# Patient Record
Sex: FEMALE | Race: WHITE | NOT HISPANIC OR LATINO | URBAN - METROPOLITAN AREA
[De-identification: names, ages, dates, MRNs, and addresses within clinical notes are randomized per-mention and may not be internally consistent; named-entity substitution may affect disease eponyms.]

---

## 2017-12-08 ENCOUNTER — OUTPATIENT (OUTPATIENT)
Dept: OUTPATIENT SERVICES | Facility: HOSPITAL | Age: 47
LOS: 1 days | Discharge: HOME | End: 2017-12-08
Payer: COMMERCIAL

## 2017-12-08 DIAGNOSIS — F33.9 MAJOR DEPRESSIVE DISORDER, RECURRENT, UNSPECIFIED: ICD-10-CM

## 2017-12-14 PROCEDURE — 99205 OFFICE O/P NEW HI 60 MIN: CPT

## 2017-12-27 LAB — HCG SERPL-ACNC: < 5 MIU/ML — SIGNIFICANT CHANGE UP

## 2017-12-27 PROCEDURE — 90870 ELECTROCONVULSIVE THERAPY: CPT

## 2017-12-29 PROCEDURE — 90870 ELECTROCONVULSIVE THERAPY: CPT

## 2018-01-02 ENCOUNTER — INPATIENT (INPATIENT)
Facility: HOSPITAL | Age: 48
LOS: 2 days | Discharge: HOME | End: 2018-01-05
Attending: PSYCHIATRY & NEUROLOGY | Admitting: PSYCHIATRY & NEUROLOGY
Payer: COMMERCIAL

## 2018-01-02 VITALS
DIASTOLIC BLOOD PRESSURE: 98 MMHG | HEART RATE: 100 BPM | RESPIRATION RATE: 18 BRPM | SYSTOLIC BLOOD PRESSURE: 125 MMHG | TEMPERATURE: 98 F | OXYGEN SATURATION: 97 %

## 2018-01-02 DIAGNOSIS — F33.2 MAJOR DEPRESSIVE DISORDER, RECURRENT SEVERE WITHOUT PSYCHOTIC FEATURES: ICD-10-CM

## 2018-01-02 DIAGNOSIS — F43.10 POST-TRAUMATIC STRESS DISORDER, UNSPECIFIED: ICD-10-CM

## 2018-01-02 DIAGNOSIS — F60.9 PERSONALITY DISORDER, UNSPECIFIED: ICD-10-CM

## 2018-01-02 LAB
ALBUMIN SERPL ELPH-MCNC: 4.4 G/DL — SIGNIFICANT CHANGE UP (ref 3.3–5)
ALP SERPL-CCNC: 78 U/L — SIGNIFICANT CHANGE UP (ref 40–120)
ALT FLD-CCNC: 18 U/L — SIGNIFICANT CHANGE UP (ref 4–33)
APAP SERPL-MCNC: < 15 UG/ML — LOW (ref 15–25)
AST SERPL-CCNC: 19 U/L — SIGNIFICANT CHANGE UP (ref 4–32)
BARBITURATES MEASUREMENT: NEGATIVE — SIGNIFICANT CHANGE UP
BASOPHILS # BLD AUTO: 0.03 K/UL — SIGNIFICANT CHANGE UP (ref 0–0.2)
BASOPHILS NFR BLD AUTO: 0.3 % — SIGNIFICANT CHANGE UP (ref 0–2)
BENZODIAZ SERPL-MCNC: NEGATIVE — SIGNIFICANT CHANGE UP
BILIRUB SERPL-MCNC: 0.3 MG/DL — SIGNIFICANT CHANGE UP (ref 0.2–1.2)
BUN SERPL-MCNC: 16 MG/DL — SIGNIFICANT CHANGE UP (ref 7–23)
CALCIUM SERPL-MCNC: 9.7 MG/DL — SIGNIFICANT CHANGE UP (ref 8.4–10.5)
CHLORIDE SERPL-SCNC: 100 MMOL/L — SIGNIFICANT CHANGE UP (ref 98–107)
CO2 SERPL-SCNC: 20 MMOL/L — LOW (ref 22–31)
CREAT SERPL-MCNC: 0.93 MG/DL — SIGNIFICANT CHANGE UP (ref 0.5–1.3)
EOSINOPHIL # BLD AUTO: 0.07 K/UL — SIGNIFICANT CHANGE UP (ref 0–0.5)
EOSINOPHIL NFR BLD AUTO: 0.7 % — SIGNIFICANT CHANGE UP (ref 0–6)
ETHANOL BLD-MCNC: < 10 MG/DL — SIGNIFICANT CHANGE UP
GLUCOSE SERPL-MCNC: 102 MG/DL — HIGH (ref 70–99)
HCT VFR BLD CALC: 40.4 % — SIGNIFICANT CHANGE UP (ref 34.5–45)
HGB BLD-MCNC: 13.6 G/DL — SIGNIFICANT CHANGE UP (ref 11.5–15.5)
IMM GRANULOCYTES # BLD AUTO: 0.04 # — SIGNIFICANT CHANGE UP
IMM GRANULOCYTES NFR BLD AUTO: 0.4 % — SIGNIFICANT CHANGE UP (ref 0–1.5)
LITHIUM SERPL-MCNC: 0.22 MMOL/L — LOW (ref 0.6–1.2)
LYMPHOCYTES # BLD AUTO: 3.08 K/UL — SIGNIFICANT CHANGE UP (ref 1–3.3)
LYMPHOCYTES # BLD AUTO: 30.3 % — SIGNIFICANT CHANGE UP (ref 13–44)
MCHC RBC-ENTMCNC: 30.3 PG — SIGNIFICANT CHANGE UP (ref 27–34)
MCHC RBC-ENTMCNC: 33.7 % — SIGNIFICANT CHANGE UP (ref 32–36)
MCV RBC AUTO: 90 FL — SIGNIFICANT CHANGE UP (ref 80–100)
MONOCYTES # BLD AUTO: 0.57 K/UL — SIGNIFICANT CHANGE UP (ref 0–0.9)
MONOCYTES NFR BLD AUTO: 5.6 % — SIGNIFICANT CHANGE UP (ref 2–14)
NEUTROPHILS # BLD AUTO: 6.38 K/UL — SIGNIFICANT CHANGE UP (ref 1.8–7.4)
NEUTROPHILS NFR BLD AUTO: 62.7 % — SIGNIFICANT CHANGE UP (ref 43–77)
NRBC # FLD: 0 — SIGNIFICANT CHANGE UP
PLATELET # BLD AUTO: 363 K/UL — SIGNIFICANT CHANGE UP (ref 150–400)
PMV BLD: 9.5 FL — SIGNIFICANT CHANGE UP (ref 7–13)
POTASSIUM SERPL-MCNC: 4.2 MMOL/L — SIGNIFICANT CHANGE UP (ref 3.5–5.3)
POTASSIUM SERPL-SCNC: 4.2 MMOL/L — SIGNIFICANT CHANGE UP (ref 3.5–5.3)
PROT SERPL-MCNC: 7.2 G/DL — SIGNIFICANT CHANGE UP (ref 6–8.3)
RBC # BLD: 4.49 M/UL — SIGNIFICANT CHANGE UP (ref 3.8–5.2)
RBC # FLD: 14 % — SIGNIFICANT CHANGE UP (ref 10.3–14.5)
SALICYLATES SERPL-MCNC: < 5 MG/DL — LOW (ref 15–30)
SODIUM SERPL-SCNC: 140 MMOL/L — SIGNIFICANT CHANGE UP (ref 135–145)
TSH SERPL-MCNC: 2.34 UIU/ML — SIGNIFICANT CHANGE UP (ref 0.27–4.2)
WBC # BLD: 10.17 K/UL — SIGNIFICANT CHANGE UP (ref 3.8–10.5)
WBC # FLD AUTO: 10.17 K/UL — SIGNIFICANT CHANGE UP (ref 3.8–10.5)

## 2018-01-02 PROCEDURE — 99285 EMERGENCY DEPT VISIT HI MDM: CPT

## 2018-01-02 RX ORDER — ALBUTEROL 90 UG/1
2 AEROSOL, METERED ORAL EVERY 6 HOURS
Qty: 0 | Refills: 0 | Status: DISCONTINUED | OUTPATIENT
Start: 2018-01-03 | End: 2018-01-05

## 2018-01-02 RX ORDER — OXYBUTYNIN CHLORIDE 5 MG
5 TABLET ORAL
Qty: 0 | Refills: 0 | Status: DISCONTINUED | OUTPATIENT
Start: 2018-01-03 | End: 2018-01-05

## 2018-01-02 RX ORDER — PROPRANOLOL HCL 160 MG
10 CAPSULE, EXTENDED RELEASE 24HR ORAL THREE TIMES A DAY
Qty: 0 | Refills: 0 | Status: DISCONTINUED | OUTPATIENT
Start: 2018-01-03 | End: 2018-01-05

## 2018-01-02 RX ORDER — TRAZODONE HCL 50 MG
100 TABLET ORAL AT BEDTIME
Qty: 0 | Refills: 0 | Status: DISCONTINUED | OUTPATIENT
Start: 2018-01-03 | End: 2018-01-05

## 2018-01-02 RX ORDER — LEVOTHYROXINE SODIUM 125 MCG
88 TABLET ORAL DAILY
Qty: 0 | Refills: 0 | Status: DISCONTINUED | OUTPATIENT
Start: 2018-01-03 | End: 2018-01-05

## 2018-01-02 RX ORDER — BUDESONIDE AND FORMOTEROL FUMARATE DIHYDRATE 160; 4.5 UG/1; UG/1
2 AEROSOL RESPIRATORY (INHALATION)
Qty: 0 | Refills: 0 | Status: DISCONTINUED | OUTPATIENT
Start: 2018-01-03 | End: 2018-01-05

## 2018-01-02 RX ORDER — MONTELUKAST 4 MG/1
10 TABLET, CHEWABLE ORAL AT BEDTIME
Qty: 0 | Refills: 0 | Status: DISCONTINUED | OUTPATIENT
Start: 2018-01-03 | End: 2018-01-05

## 2018-01-02 RX ADMIN — Medication 1 MILLIGRAM(S): at 23:29

## 2018-01-02 NOTE — ED BEHAVIORAL HEALTH ASSESSMENT NOTE - PAST PSYCHOTROPIC MEDICATION
Prozac, Zoloft, Lexapro, Remeron, Buspar, Wellbutrin, Seroquel, Abilify, Depakote, Lamictal Prozac, Zoloft, Lexapro, Remeron, Buspar, Wellbutrin, Seroquel, Abilify, Depakote, Lamictal, Risperdal

## 2018-01-02 NOTE — ED ADULT NURSE REASSESSMENT NOTE - NS ED NURSE REASSESS COMMENT FT1
Psych eval completed, labs results and ekg pending. Pt to be admitted to [facility] voluntarily once medically cleared. Pt remains awake, tearful with sad affect. Will continue to monitor for safety.

## 2018-01-02 NOTE — ED BEHAVIORAL HEALTH ASSESSMENT NOTE - SUBSTANCE ISSUES AND PLAN (INCLUDE STANDING AND PRN MEDICATION)
admits to cannabis use, denies abuse or dependence, no signs of withdrawal, no indication for CIWA/CINA/taper/detox

## 2018-01-02 NOTE — ED BEHAVIORAL HEALTH ASSESSMENT NOTE - NS ED BHA PLAN ADMIT TO PSYCHIATRY BH CONTACTED FT
d/w Dr. Daniels, outpatient psychiatrist d/w Dr. Daniels, outpatient psychiatrist, also informed ECT psychiatrist, Dr. Charlette Sherman MD

## 2018-01-02 NOTE — ED BEHAVIORAL HEALTH ASSESSMENT NOTE - SUMMARY
This is a 48 yo   female, history of MDD, PTSD, personality disorder, multiple psychiatric hospitalizations, past history of overdose in a suicide attempt, in outpatient treatment with psychiatrist and therapist, recently started in outpatient ECT treatment last week with medication change of recent discontinuation of Lithium, brought to [facility] ED as recommended by Dr. Daniels, outpatient psychiatrist, for recommended inpatient treatment with acute suicidal ideation.  Patient with worsening of depressive symptoms since November and as a result had started ECT just one week ago, at the time when Lithium was also discontinued with reported worsening of symptoms since that time.  Patient endorses depressed mood, feelings of hopelessness, anhedonia, inappropriate guilt, low energy, worthlessness, poor sleep, poor appetite with acute SI with stated plan of overdose endorsed.  Patient reports vague somatic symptoms of not being able to taste correctly and generally "not feeling right" ever since her second ECT treatment, last Thursday, which resulted in patient cancelling continued ECT treatment.  Patient was just restarted on Lithium but continues to exhibit decompensation in mood symptoms with severe anxiety and depression and as a result was advised to come to the [facility] ER today for voluntary admission, recommended by psychiatrist.  Patient with severe depressive symptoms, overwhelming anxiety with recent panic attack symptoms with endorsed SI with plan to overdose present.  Patient represents an acute risk to self at this time and is agreeable to voluntary psychiatric hospitalization.  Admit to [address] on a 9.13, voluntary legal status.  No need for constant observation in a locked, supervised setting, patient able to contract for safety in hospital setting.  Patient transported to unit accompanied by security for safety.
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
## 2018-01-02 NOTE — ED BEHAVIORAL HEALTH ASSESSMENT NOTE - CURRENT MEDICATION
Trazodone 100mg HS, Lithium 300mg BID, Effexor 300mg HS, Prazosin 3mg HS, Propranol, Klonopin 0.5mg BID, Synthroid 88mcg daily, Symbicort 160mg INh BID, Singulair 10mg HS, Myrbetriq 50mg HS, Vesicare 10mg AM, Centrum daily, Vitamin D daily, Trulicity injection weekly (received today, 1/2/18, as per patient.    I-Stop reviewed: Clonazepam 1mg, disp#60, 30 day supply by Dr. Carine Daniels on 12/14/17, Phentermine 15mg disp#60, 30 day supply by Isidro Strauss MD on 10/25/17

## 2018-01-02 NOTE — ED PROVIDER NOTE - OBJECTIVE STATEMENT
The patient is a 47y Female The patient is a 47y Female hx of depression, PTSD, cholecystectomy, urinary bladder lift, s/p ECT last wks presents to ed for worsening depression w/ Si and plan to overdose on pills.  Pt denies all medical complaints at present but reported smoking MJ today to her with her symptoms of depression.  recent injuries, trauma or falls, no headache, back or neck pain, no abd/flank pain, no uti/urinary symptoms, nausea or vomiting, no fever or chills, no cp or sob, no palpitations or diaphoresis.  Denies etoh, no HI, no AVH.  Endorsed no other symptoms.

## 2018-01-02 NOTE — ED BEHAVIORAL HEALTH ASSESSMENT NOTE - RISK ASSESSMENT
Risk factors include depressed mood, anhedonia, hopelessness, severe anxiety, cannabis use and past history of suicide attempt and past psychiatric hospitalizations.  Precipitating factor seems to be recent change in medications with Lithium stopped and ECT started.  Protective factors include supportive family and positive therapeutic relationship with psychiatrist.  Patient with acute SI with stated plan to overdose and represents an acute risk to self at this time.

## 2018-01-02 NOTE — ED BEHAVIORAL HEALTH ASSESSMENT NOTE - MEDICAL ISSUES AND PLAN (INCLUDE STANDING AND PRN MEDICATION)
medically cleared as per EM provider, restarted on outpatient regimen of Synthroid for hypothyroidism, Symbicort BID and Singulair daily with Albuterol prn for asthma, oxybutynin 5mg BID provided for overactive bladder as vesicare not on formulary, propranolol 10mg TID for palpitations.

## 2018-01-02 NOTE — ED BEHAVIORAL HEALTH ASSESSMENT NOTE - DESCRIPTION
Vital Signs Last 24 Hrs  T(C): 36.7 (02 Jan 2018 20:15), Max: 36.7 (02 Jan 2018 20:15)  T(F): 98 (02 Jan 2018 20:15), Max: 98 (02 Jan 2018 20:15)  HR: 100 (02 Jan 2018 20:15) (100 - 100)  BP: 125/98 (02 Jan 2018 20:15) (125/98 - 125/98)  BP(mean): --  RR: 18 (02 Jan 2018 20:15) (18 - 18)  SpO2: 97% (02 Jan 2018 20:15) (97% - 97%) asthma, hypothyroidism, urinary bladder dysfunction visibly anxious and tremulous, cooperative but has difficulty engaging in interview due to anxiety symptoms    Vital Signs Last 24 Hrs  T(C): 36.7 (02 Jan 2018 20:15), Max: 36.7 (02 Jan 2018 20:15)  T(F): 98 (02 Jan 2018 20:15), Max: 98 (02 Jan 2018 20:15)  HR: 100 (02 Jan 2018 20:15) (100 - 100)  BP: 125/98 (02 Jan 2018 20:15) (125/98 - 125/98)  BP(mean): --  RR: 18 (02 Jan 2018 20:15) (18 - 18)  SpO2: 97% (02 Jan 2018 20:15) (97% - 97%) Lives with  and daughter, son is in college in [place], received a masters, prior work as a guidance counselor.

## 2018-01-02 NOTE — ED BEHAVIORAL HEALTH ASSESSMENT NOTE - OTHER PAST PSYCHIATRIC HISTORY (INCLUDE DETAILS REGARDING ONSET, COURSE OF ILLNESS, INPATIENT/OUTPATIENT TREATMENT)
Reported history of MDD with psychosis, PTSD with multiple past psychiatric hospitalizations since her 30's, including at [facility] most recently in 2015, previously at [facility], approximately 10 past hospitalizations.  Past history of overdoses and past history of cutting behavior, most recently 5 months ago.  Multiple past ECT treatments, most recently received treatment #1 and #2 last week, most recently 5 days ago, past history of good response in 2008, 2009 and 2013. Reported history of MDD with psychosis, PTSD with multiple past psychiatric hospitalizations since her 30's, including at [facility] most recently in 2015, previously at [facility], approximately 10 past hospitalizations.  Past history of overdoses and past history of cutting behavior, most recently 5 months ago.  Multiple past ECT treatments, most recently received treatment #1 and #2 last week, most recently 5 days ago, past history of good response in 2008, 2009 and 2013.  Sees outpatient psychiatrist, Dr. Daniels and therapist, Belgica Gray regularly.

## 2018-01-02 NOTE — ED PROVIDER NOTE - CHPI ED SYMPTOMS NEG
no paranoia/no hallucinations/no change in level of consciousness/no disorientation/no weight loss/no homicidal/no agitation/no confusion/no weakness

## 2018-01-02 NOTE — ED ADULT NURSE NOTE - PAIN RATING/NUMBER SCALE (0-10): ACTIVITY
Pt discharged home with parent/guardian. Pt acting age appropriately, respirations regular and unlabored, cap refill less than two seconds. Skin pink, dry and warm. Lungs clear bilaterally. No further complaints at this time. Parent/guardian verbalized understanding of discharge paperwork and has no further questions at this time. Education provided about continuation of care, follow up care and medication administration. Parent/guardian able to provided teach back about discharge instructions.
Pt given apple juice and tej coburn for PO challenge.
Pt sipping on juice with no n/v. Pt sitting on stretcher playing with dad. Dad educated on how to take care of hand foot and mouth disease. Dad given tylenol/motrin dosing chart.
0

## 2018-01-02 NOTE — ED BEHAVIORAL HEALTH ASSESSMENT NOTE - PRIMARY DX
Severe episode of recurrent major depressive disorder, without psychotic features Personality disorder, unspecified

## 2018-01-02 NOTE — ED ADULT TRIAGE NOTE - CHIEF COMPLAINT QUOTE
Pt had ECT therapy within the past few days and now states "I am freaking out". Feels anxious and shaky and "terrified". Has had ECT before but has not effected her in this way. PMH MDD, PTSD, panic d/o, OCD, borderline personality d/o. Reports SI and was planning to take tranquilizers but did not. Pt admits to smoking marijuana to try to calm herself down but denies any other drug use. Denies HI, alcohol use.

## 2018-01-02 NOTE — ED BEHAVIORAL HEALTH ASSESSMENT NOTE - ORAL MEDICATION DETAILS
Ativan 1mg PO x 1 for anxiety with benefit Ativan 1mg PO x 1 at 23:00 for acute anxiety with benefit

## 2018-01-02 NOTE — ED PROVIDER NOTE - MEDICAL DECISION MAKING DETAILS
46y/o Female hx of depression, PTSD, cholecystectomy, urinary bladder lift, s/p ECT last wk presents to ed for worsening depression w/ Si and plan to overdose on pills.  Pt is anxious appearing w/o visible signs of physical injuries on exam, alert and oriented, articulate speech, head NCAT, ambulating w/ steady gait, lungs are clear bilat, cor rrr, abd sft, nt, nd, nr, no guarding, ext no edema.  Labs and psych consulted- Remainder of plan as per psych-
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
## 2018-01-02 NOTE — ED BEHAVIORAL HEALTH ASSESSMENT NOTE - PSYCHIATRIC ISSUES AND PLAN (INCLUDE STANDING AND PRN MEDICATION)
Restart Lithium at 300mg BID with recommended continued titration, restart Trazodone 100mg HS for depression, change Effexor to XR formulation and will start at 150mg in AM, Prazosin 3mg HS for PTSD, will hold klonopin and provide Ativan 1mg PO q6hr prn anxiety with Ativan 2mg IM prn severe agitation.
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
## 2018-01-03 DIAGNOSIS — Z90.49 ACQUIRED ABSENCE OF OTHER SPECIFIED PARTS OF DIGESTIVE TRACT: Chronic | ICD-10-CM

## 2018-01-03 DIAGNOSIS — F32.9 MAJOR DEPRESSIVE DISORDER, SINGLE EPISODE, UNSPECIFIED: ICD-10-CM

## 2018-01-03 PROCEDURE — 99222 1ST HOSP IP/OBS MODERATE 55: CPT

## 2018-01-03 RX ORDER — VENLAFAXINE HCL 75 MG
150 CAPSULE, EXT RELEASE 24 HR ORAL DAILY
Qty: 0 | Refills: 0 | Status: DISCONTINUED | OUTPATIENT
Start: 2018-01-03 | End: 2018-01-05

## 2018-01-03 RX ORDER — PRAZOSIN HCL 2 MG
3 CAPSULE ORAL AT BEDTIME
Qty: 0 | Refills: 0 | Status: DISCONTINUED | OUTPATIENT
Start: 2018-01-03 | End: 2018-01-05

## 2018-01-03 RX ORDER — MULTIVIT-MIN/FERROUS GLUCONATE 9 MG/15 ML
1 LIQUID (ML) ORAL DAILY
Qty: 0 | Refills: 0 | Status: DISCONTINUED | OUTPATIENT
Start: 2018-01-03 | End: 2018-01-05

## 2018-01-03 RX ORDER — BUDESONIDE AND FORMOTEROL FUMARATE DIHYDRATE 160; 4.5 UG/1; UG/1
2 AEROSOL RESPIRATORY (INHALATION)
Qty: 0 | Refills: 0 | COMMUNITY

## 2018-01-03 RX ORDER — TRAZODONE HCL 50 MG
100 TABLET ORAL ONCE
Qty: 0 | Refills: 0 | Status: COMPLETED | OUTPATIENT
Start: 2018-01-03 | End: 2018-01-03

## 2018-01-03 RX ORDER — MONTELUKAST 4 MG/1
1 TABLET, CHEWABLE ORAL
Qty: 0 | Refills: 0 | COMMUNITY

## 2018-01-03 RX ORDER — SOLIFENACIN SUCCINATE 10 MG/1
1 TABLET ORAL
Qty: 0 | Refills: 0 | COMMUNITY

## 2018-01-03 RX ORDER — LITHIUM CARBONATE 300 MG/1
300 TABLET, EXTENDED RELEASE ORAL
Qty: 0 | Refills: 0 | Status: DISCONTINUED | OUTPATIENT
Start: 2018-01-03 | End: 2018-01-05

## 2018-01-03 RX ADMIN — Medication 100 MILLIGRAM(S): at 20:33

## 2018-01-03 RX ADMIN — Medication 88 MICROGRAM(S): at 09:03

## 2018-01-03 RX ADMIN — Medication 100 MILLIGRAM(S): at 01:15

## 2018-01-03 RX ADMIN — Medication 5 MILLIGRAM(S): at 20:33

## 2018-01-03 RX ADMIN — Medication 1 MILLIGRAM(S): at 16:37

## 2018-01-03 RX ADMIN — Medication 10 MILLIGRAM(S): at 09:03

## 2018-01-03 RX ADMIN — LITHIUM CARBONATE 300 MILLIGRAM(S): 300 TABLET, EXTENDED RELEASE ORAL at 09:03

## 2018-01-03 RX ADMIN — Medication 1 TABLET(S): at 09:03

## 2018-01-03 RX ADMIN — LITHIUM CARBONATE 300 MILLIGRAM(S): 300 TABLET, EXTENDED RELEASE ORAL at 20:33

## 2018-01-03 RX ADMIN — MONTELUKAST 10 MILLIGRAM(S): 4 TABLET, CHEWABLE ORAL at 20:33

## 2018-01-03 RX ADMIN — Medication 10 MILLIGRAM(S): at 13:20

## 2018-01-03 RX ADMIN — Medication 3 MILLIGRAM(S): at 20:33

## 2018-01-03 RX ADMIN — BUDESONIDE AND FORMOTEROL FUMARATE DIHYDRATE 2 PUFF(S): 160; 4.5 AEROSOL RESPIRATORY (INHALATION) at 09:05

## 2018-01-03 RX ADMIN — Medication 10 MILLIGRAM(S): at 20:33

## 2018-01-03 RX ADMIN — Medication 1 MILLIGRAM(S): at 05:15

## 2018-01-03 RX ADMIN — Medication 5 MILLIGRAM(S): at 09:03

## 2018-01-03 RX ADMIN — Medication 150 MILLIGRAM(S): at 09:03

## 2018-01-03 RX ADMIN — BUDESONIDE AND FORMOTEROL FUMARATE DIHYDRATE 2 PUFF(S): 160; 4.5 AEROSOL RESPIRATORY (INHALATION) at 20:49

## 2018-01-04 PROCEDURE — 99232 SBSQ HOSP IP/OBS MODERATE 35: CPT

## 2018-01-04 RX ORDER — LOPERAMIDE HCL 2 MG
2 TABLET ORAL EVERY 4 HOURS
Qty: 0 | Refills: 0 | Status: DISCONTINUED | OUTPATIENT
Start: 2018-01-04 | End: 2018-01-05

## 2018-01-04 RX ADMIN — Medication 150 MILLIGRAM(S): at 09:20

## 2018-01-04 RX ADMIN — Medication 10 MILLIGRAM(S): at 20:28

## 2018-01-04 RX ADMIN — Medication 10 MILLIGRAM(S): at 09:20

## 2018-01-04 RX ADMIN — Medication 88 MICROGRAM(S): at 09:20

## 2018-01-04 RX ADMIN — Medication 100 MILLIGRAM(S): at 20:28

## 2018-01-04 RX ADMIN — Medication 10 MILLIGRAM(S): at 12:45

## 2018-01-04 RX ADMIN — Medication 2 MILLIGRAM(S): at 13:26

## 2018-01-04 RX ADMIN — Medication 1 TABLET(S): at 09:20

## 2018-01-04 RX ADMIN — BUDESONIDE AND FORMOTEROL FUMARATE DIHYDRATE 2 PUFF(S): 160; 4.5 AEROSOL RESPIRATORY (INHALATION) at 09:21

## 2018-01-04 RX ADMIN — LITHIUM CARBONATE 300 MILLIGRAM(S): 300 TABLET, EXTENDED RELEASE ORAL at 20:28

## 2018-01-04 RX ADMIN — Medication 5 MILLIGRAM(S): at 20:28

## 2018-01-04 RX ADMIN — MONTELUKAST 10 MILLIGRAM(S): 4 TABLET, CHEWABLE ORAL at 20:28

## 2018-01-04 RX ADMIN — Medication 1 MILLIGRAM(S): at 11:05

## 2018-01-04 RX ADMIN — Medication 3 MILLIGRAM(S): at 20:28

## 2018-01-04 RX ADMIN — BUDESONIDE AND FORMOTEROL FUMARATE DIHYDRATE 2 PUFF(S): 160; 4.5 AEROSOL RESPIRATORY (INHALATION) at 20:28

## 2018-01-04 RX ADMIN — Medication 5 MILLIGRAM(S): at 09:20

## 2018-01-04 RX ADMIN — LITHIUM CARBONATE 300 MILLIGRAM(S): 300 TABLET, EXTENDED RELEASE ORAL at 09:20

## 2018-01-05 VITALS — RESPIRATION RATE: 18 BRPM | TEMPERATURE: 98 F

## 2018-01-05 PROCEDURE — 90853 GROUP PSYCHOTHERAPY: CPT

## 2018-01-05 PROCEDURE — 99238 HOSP IP/OBS DSCHRG MGMT 30/<: CPT | Mod: 25

## 2018-01-05 RX ORDER — ACETAMINOPHEN 500 MG
650 TABLET ORAL EVERY 6 HOURS
Qty: 0 | Refills: 0 | Status: DISCONTINUED | OUTPATIENT
Start: 2018-01-05 | End: 2018-01-05

## 2018-01-05 RX ADMIN — Medication 88 MICROGRAM(S): at 09:05

## 2018-01-05 RX ADMIN — Medication 650 MILLIGRAM(S): at 12:47

## 2018-01-05 RX ADMIN — Medication 1 TABLET(S): at 09:06

## 2018-01-05 RX ADMIN — Medication 10 MILLIGRAM(S): at 09:06

## 2018-01-05 RX ADMIN — LITHIUM CARBONATE 300 MILLIGRAM(S): 300 TABLET, EXTENDED RELEASE ORAL at 09:06

## 2018-01-05 RX ADMIN — Medication 5 MILLIGRAM(S): at 09:06

## 2018-01-05 RX ADMIN — Medication 650 MILLIGRAM(S): at 12:32

## 2018-01-05 RX ADMIN — Medication 150 MILLIGRAM(S): at 09:06

## 2018-01-05 RX ADMIN — Medication 10 MILLIGRAM(S): at 13:17

## 2018-02-16 ENCOUNTER — APPOINTMENT (OUTPATIENT)
Dept: UROGYNECOLOGY | Facility: CLINIC | Age: 48
End: 2018-02-16
Payer: COMMERCIAL

## 2018-02-16 VITALS
DIASTOLIC BLOOD PRESSURE: 78 MMHG | SYSTOLIC BLOOD PRESSURE: 129 MMHG | HEIGHT: 69 IN | WEIGHT: 280 LBS | HEART RATE: 68 BPM | BODY MASS INDEX: 41.47 KG/M2

## 2018-02-16 DIAGNOSIS — E66.9 OBESITY, UNSPECIFIED: ICD-10-CM

## 2018-02-16 DIAGNOSIS — K59.00 CONSTIPATION, UNSPECIFIED: ICD-10-CM

## 2018-02-16 DIAGNOSIS — J45.909 UNSPECIFIED ASTHMA, UNCOMPLICATED: ICD-10-CM

## 2018-02-16 DIAGNOSIS — Z86.39 PERSONAL HISTORY OF OTHER ENDOCRINE, NUTRITIONAL AND METABOLIC DISEASE: ICD-10-CM

## 2018-02-16 DIAGNOSIS — Z86.59 PERSONAL HISTORY OF OTHER MENTAL AND BEHAVIORAL DISORDERS: ICD-10-CM

## 2018-02-16 DIAGNOSIS — Z87.19 PERSONAL HISTORY OF OTHER DISEASES OF THE DIGESTIVE SYSTEM: ICD-10-CM

## 2018-02-16 DIAGNOSIS — Z87.09 PERSONAL HISTORY OF OTHER DISEASES OF THE RESPIRATORY SYSTEM: ICD-10-CM

## 2018-02-16 DIAGNOSIS — Z80.1 FAMILY HISTORY OF MALIGNANT NEOPLASM OF TRACHEA, BRONCHUS AND LUNG: ICD-10-CM

## 2018-02-16 DIAGNOSIS — Z83.49 FAMILY HISTORY OF OTHER ENDOCRINE, NUTRITIONAL AND METABOLIC DISEASES: ICD-10-CM

## 2018-02-16 LAB
BILIRUB UR QL STRIP: NORMAL
CLARITY UR: CLEAR
COLLECTION METHOD: NORMAL
GLUCOSE UR-MCNC: NORMAL
HCG UR QL: 0.2 EU/DL
HGB UR QL STRIP.AUTO: NORMAL
KETONES UR-MCNC: NORMAL
LEUKOCYTE ESTERASE UR QL STRIP: NORMAL
NITRITE UR QL STRIP: NORMAL
PH UR STRIP: 7
PROT UR STRIP-MCNC: NORMAL
SP GR UR STRIP: 1.01

## 2018-02-16 PROCEDURE — 51701 INSERT BLADDER CATHETER: CPT

## 2018-02-16 PROCEDURE — 99204 OFFICE O/P NEW MOD 45 MIN: CPT | Mod: 25

## 2018-02-16 PROCEDURE — 81003 URINALYSIS AUTO W/O SCOPE: CPT | Mod: QW

## 2018-02-16 RX ORDER — BENZTROPINE MESYLATE 1 MG/1
1 TABLET ORAL
Qty: 180 | Refills: 0 | Status: DISCONTINUED | COMMUNITY
Start: 2017-09-15

## 2018-02-16 RX ORDER — HALOBETASOL PROPIONATE 0.5 MG/G
0.05 CREAM TOPICAL
Qty: 150 | Refills: 0 | Status: DISCONTINUED | COMMUNITY
Start: 2017-09-11

## 2018-02-16 RX ORDER — UBIDECARENONE/VIT E ACET 100MG-5
CAPSULE ORAL
Refills: 0 | Status: ACTIVE | COMMUNITY

## 2018-02-16 RX ORDER — AMOXICILLIN 875 MG/1
875 TABLET, FILM COATED ORAL
Qty: 14 | Refills: 0 | Status: DISCONTINUED | COMMUNITY
Start: 2017-09-14

## 2018-02-16 RX ORDER — LORAZEPAM 0.5 MG/1
0.5 TABLET ORAL
Qty: 60 | Refills: 0 | Status: DISCONTINUED | COMMUNITY
Start: 2018-01-16

## 2018-02-16 RX ORDER — CLONAZEPAM 1 MG/1
1 TABLET ORAL
Qty: 60 | Refills: 0 | Status: DISCONTINUED | COMMUNITY
Start: 2017-12-12

## 2018-02-16 RX ORDER — PHENTERMINE HYDROCHLORIDE 15 MG/1
15 CAPSULE ORAL
Qty: 60 | Refills: 0 | Status: DISCONTINUED | COMMUNITY
Start: 2017-10-19

## 2018-02-16 RX ORDER — ARIPIPRAZOLE 2 MG/1
TABLET ORAL
Refills: 0 | Status: ACTIVE | COMMUNITY

## 2018-02-16 RX ORDER — MONTELUKAST 10 MG/1
10 TABLET, FILM COATED ORAL
Qty: 90 | Refills: 0 | Status: DISCONTINUED | COMMUNITY
Start: 2017-06-05

## 2018-02-16 RX ORDER — OSELTAMIVIR PHOSPHATE 75 MG/1
75 CAPSULE ORAL
Qty: 10 | Refills: 0 | Status: DISCONTINUED | COMMUNITY
Start: 2018-01-20

## 2018-02-16 RX ORDER — FENOFIBRATE 145 MG/1
TABLET ORAL
Refills: 0 | Status: ACTIVE | COMMUNITY

## 2018-02-16 RX ORDER — LEVOTHYROXINE SODIUM 88 UG/1
88 TABLET ORAL
Qty: 90 | Refills: 0 | Status: DISCONTINUED | COMMUNITY
Start: 2017-05-22

## 2018-02-16 RX ORDER — MIRABEGRON 50 MG/1
50 TABLET, FILM COATED, EXTENDED RELEASE ORAL
Qty: 90 | Refills: 0 | Status: DISCONTINUED | COMMUNITY
Start: 2017-02-15

## 2018-02-16 RX ORDER — PANTOPRAZOLE 40 MG/1
40 TABLET, DELAYED RELEASE ORAL
Qty: 90 | Refills: 0 | Status: DISCONTINUED | COMMUNITY
Start: 2017-10-10

## 2018-02-16 RX ORDER — PREDNISONE 20 MG/1
20 TABLET ORAL
Qty: 18 | Refills: 0 | Status: DISCONTINUED | COMMUNITY
Start: 2018-01-27

## 2018-02-16 RX ORDER — CLOBETASOL PROPIONATE 0.5 MG/ML
0.05 SOLUTION TOPICAL
Qty: 50 | Refills: 0 | Status: DISCONTINUED | COMMUNITY
Start: 2017-10-06

## 2018-02-16 RX ORDER — PROPRANOLOL HYDROCHLORIDE 10 MG/1
10 TABLET ORAL
Qty: 270 | Refills: 0 | Status: DISCONTINUED | COMMUNITY
Start: 2017-11-15

## 2018-02-16 RX ORDER — PRAZOSIN HYDROCHLORIDE 1 MG/1
1 CAPSULE ORAL
Qty: 270 | Refills: 0 | Status: DISCONTINUED | COMMUNITY
Start: 2017-08-09

## 2018-02-16 RX ORDER — TRAZODONE HYDROCHLORIDE 100 MG/1
100 TABLET ORAL
Qty: 90 | Refills: 0 | Status: DISCONTINUED | COMMUNITY
Start: 2017-09-07

## 2018-02-16 RX ORDER — CLOTRIMAZOLE AND BETAMETHASONE DIPROPIONATE 10; .5 MG/G; MG/G
1-0.05 CREAM TOPICAL
Qty: 15 | Refills: 0 | Status: DISCONTINUED | COMMUNITY
Start: 2017-08-21

## 2018-02-16 RX ORDER — SOLIFENACIN SUCCINATE 5 MG/1
5 TABLET, FILM COATED ORAL
Qty: 90 | Refills: 0 | Status: DISCONTINUED | COMMUNITY
Start: 2017-02-15

## 2018-02-16 RX ORDER — MULTIVIT-MIN/FA/LYCOPEN/LUTEIN .4-300-25
TABLET ORAL
Refills: 0 | Status: ACTIVE | COMMUNITY

## 2018-02-16 RX ORDER — CLINDAMYCIN HYDROCHLORIDE 300 MG/1
300 CAPSULE ORAL
Qty: 26 | Refills: 0 | Status: DISCONTINUED | COMMUNITY
Start: 2017-09-01

## 2018-02-16 RX ORDER — LORAZEPAM 1 MG/1
1 TABLET ORAL
Qty: 30 | Refills: 0 | Status: DISCONTINUED | COMMUNITY
Start: 2017-09-07

## 2018-02-16 RX ORDER — ARIPIPRAZOLE 5 MG/1
5 TABLET ORAL
Qty: 90 | Refills: 0 | Status: DISCONTINUED | COMMUNITY
Start: 2017-09-07

## 2018-02-16 RX ORDER — ALBUTEROL SULFATE 90 UG/1
108 (90 BASE) AEROSOL, METERED RESPIRATORY (INHALATION)
Qty: 54 | Refills: 0 | Status: DISCONTINUED | COMMUNITY
Start: 2017-11-13

## 2018-02-16 RX ORDER — DULAGLUTIDE 1.5 MG/.5ML
1.5 INJECTION, SOLUTION SUBCUTANEOUS
Qty: 2 | Refills: 0 | Status: DISCONTINUED | COMMUNITY
Start: 2017-12-26

## 2018-02-16 RX ORDER — CIPROFLOXACIN HYDROCHLORIDE 500 MG/1
500 TABLET, FILM COATED ORAL
Qty: 10 | Refills: 0 | Status: DISCONTINUED | COMMUNITY
Start: 2018-01-27

## 2018-02-16 RX ORDER — LITHIUM CARBONATE 300 MG/1
300 TABLET, FILM COATED, EXTENDED RELEASE ORAL
Qty: 180 | Refills: 0 | Status: DISCONTINUED | COMMUNITY
Start: 2017-09-07

## 2018-02-16 RX ORDER — FLUCONAZOLE 200 MG/1
200 TABLET ORAL
Qty: 3 | Refills: 0 | Status: DISCONTINUED | COMMUNITY
Start: 2017-08-21

## 2018-02-16 RX ORDER — VENLAFAXINE HYDROCHLORIDE 150 MG/1
150 CAPSULE, EXTENDED RELEASE ORAL
Qty: 180 | Refills: 0 | Status: DISCONTINUED | COMMUNITY
Start: 2017-09-07

## 2018-02-16 RX ORDER — HYDROCODONE BITARTRATE AND ACETAMINOPHEN 7.5; 325 MG/1; MG/1
7.5-325 TABLET ORAL
Qty: 30 | Refills: 0 | Status: DISCONTINUED | COMMUNITY
Start: 2017-09-11

## 2018-02-16 RX ORDER — LEVOTHYROXINE SODIUM 137 UG/1
TABLET ORAL
Refills: 0 | Status: ACTIVE | COMMUNITY

## 2018-03-15 ENCOUNTER — APPOINTMENT (OUTPATIENT)
Dept: UROGYNECOLOGY | Facility: CLINIC | Age: 48
End: 2018-03-15
Payer: COMMERCIAL

## 2018-03-15 ENCOUNTER — OUTPATIENT (OUTPATIENT)
Dept: OUTPATIENT SERVICES | Facility: HOSPITAL | Age: 48
LOS: 1 days | End: 2018-03-15
Payer: COMMERCIAL

## 2018-03-15 DIAGNOSIS — Z01.818 ENCOUNTER FOR OTHER PREPROCEDURAL EXAMINATION: ICD-10-CM

## 2018-03-15 DIAGNOSIS — Z90.49 ACQUIRED ABSENCE OF OTHER SPECIFIED PARTS OF DIGESTIVE TRACT: Chronic | ICD-10-CM

## 2018-03-15 PROCEDURE — 52000 CYSTOURETHROSCOPY: CPT

## 2018-03-19 DIAGNOSIS — N39.46 MIXED INCONTINENCE: ICD-10-CM

## 2018-03-27 ENCOUNTER — APPOINTMENT (OUTPATIENT)
Dept: UROGYNECOLOGY | Facility: CLINIC | Age: 48
End: 2018-03-27
Payer: COMMERCIAL

## 2018-03-27 ENCOUNTER — OUTPATIENT (OUTPATIENT)
Dept: OUTPATIENT SERVICES | Facility: HOSPITAL | Age: 48
LOS: 1 days | End: 2018-03-27
Payer: COMMERCIAL

## 2018-03-27 DIAGNOSIS — Z90.49 ACQUIRED ABSENCE OF OTHER SPECIFIED PARTS OF DIGESTIVE TRACT: Chronic | ICD-10-CM

## 2018-03-27 DIAGNOSIS — Z01.818 ENCOUNTER FOR OTHER PREPROCEDURAL EXAMINATION: ICD-10-CM

## 2018-03-27 DIAGNOSIS — N39.46 MIXED INCONTINENCE: ICD-10-CM

## 2018-03-27 LAB
BILIRUB UR QL STRIP: NORMAL
CLARITY UR: CLEAR
COLLECTION METHOD: NORMAL
GLUCOSE UR-MCNC: NORMAL
HCG UR QL: 0.2 EU/DL
HGB UR QL STRIP.AUTO: NORMAL
KETONES UR-MCNC: NORMAL
LEUKOCYTE ESTERASE UR QL STRIP: NORMAL
NITRITE UR QL STRIP: NORMAL
PH UR STRIP: 6
PROT UR STRIP-MCNC: NORMAL
SP GR UR STRIP: 1.01

## 2018-03-27 PROCEDURE — 51729 CYSTOMETROGRAM W/VP&UP: CPT

## 2018-03-27 PROCEDURE — 51797 INTRAABDOMINAL PRESSURE TEST: CPT | Mod: 26

## 2018-03-27 PROCEDURE — 51784 ANAL/URINARY MUSCLE STUDY: CPT | Mod: 26

## 2018-03-27 PROCEDURE — 51784 ANAL/URINARY MUSCLE STUDY: CPT

## 2018-03-27 PROCEDURE — 51729 CYSTOMETROGRAM W/VP&UP: CPT | Mod: 26

## 2018-03-27 PROCEDURE — 51797 INTRAABDOMINAL PRESSURE TEST: CPT

## 2018-03-30 DIAGNOSIS — N39.3 STRESS INCONTINENCE (FEMALE) (MALE): ICD-10-CM

## 2018-03-30 DIAGNOSIS — N39.46 MIXED INCONTINENCE: ICD-10-CM

## 2018-04-02 ENCOUNTER — APPOINTMENT (OUTPATIENT)
Dept: UROGYNECOLOGY | Facility: CLINIC | Age: 48
End: 2018-04-02
Payer: COMMERCIAL

## 2018-04-02 DIAGNOSIS — N39.3 STRESS INCONTINENCE (FEMALE) (MALE): ICD-10-CM

## 2018-04-02 DIAGNOSIS — R33.9 RETENTION OF URINE, UNSPECIFIED: ICD-10-CM

## 2018-04-02 DIAGNOSIS — N36.41 HYPERMOBILITY OF URETHRA: ICD-10-CM

## 2018-04-02 PROCEDURE — 99213 OFFICE O/P EST LOW 20 MIN: CPT

## 2018-05-02 ENCOUNTER — APPOINTMENT (OUTPATIENT)
Dept: UROGYNECOLOGY | Facility: CLINIC | Age: 48
End: 2018-05-02

## 2018-05-26 NOTE — ED PROVIDER NOTE - PR
Patient presents to ED with fever, generalized weakness, body ache, chill for 3 days. Patient denies coughing, sob, chest pain.   
154

## 2018-05-30 ENCOUNTER — APPOINTMENT (OUTPATIENT)
Dept: UROGYNECOLOGY | Facility: CLINIC | Age: 48
End: 2018-05-30

## 2018-07-20 ENCOUNTER — INPATIENT (INPATIENT)
Facility: HOSPITAL | Age: 48
LOS: 2 days | Discharge: HOME | End: 2018-07-23
Attending: PSYCHIATRY & NEUROLOGY | Admitting: PSYCHIATRY & NEUROLOGY
Payer: COMMERCIAL

## 2018-07-20 VITALS
DIASTOLIC BLOOD PRESSURE: 70 MMHG | SYSTOLIC BLOOD PRESSURE: 113 MMHG | RESPIRATION RATE: 16 BRPM | HEART RATE: 74 BPM | OXYGEN SATURATION: 97 % | TEMPERATURE: 98 F

## 2018-07-20 DIAGNOSIS — F43.10 POST-TRAUMATIC STRESS DISORDER, UNSPECIFIED: ICD-10-CM

## 2018-07-20 DIAGNOSIS — F33.2 MAJOR DEPRESSIVE DISORDER, RECURRENT SEVERE WITHOUT PSYCHOTIC FEATURES: ICD-10-CM

## 2018-07-20 DIAGNOSIS — F32.9 MAJOR DEPRESSIVE DISORDER, SINGLE EPISODE, UNSPECIFIED: ICD-10-CM

## 2018-07-20 DIAGNOSIS — Z90.49 ACQUIRED ABSENCE OF OTHER SPECIFIED PARTS OF DIGESTIVE TRACT: Chronic | ICD-10-CM

## 2018-07-20 LAB
ALBUMIN SERPL ELPH-MCNC: 3.9 G/DL — SIGNIFICANT CHANGE UP (ref 3.3–5)
ALP SERPL-CCNC: 64 U/L — SIGNIFICANT CHANGE UP (ref 40–120)
ALT FLD-CCNC: 16 U/L — SIGNIFICANT CHANGE UP (ref 4–33)
APAP SERPL-MCNC: < 15 UG/ML — LOW (ref 15–25)
AST SERPL-CCNC: 15 U/L — SIGNIFICANT CHANGE UP (ref 4–32)
BASOPHILS # BLD AUTO: 0.05 K/UL — SIGNIFICANT CHANGE UP (ref 0–0.2)
BASOPHILS NFR BLD AUTO: 0.5 % — SIGNIFICANT CHANGE UP (ref 0–2)
BILIRUB SERPL-MCNC: < 0.2 MG/DL — LOW (ref 0.2–1.2)
BUN SERPL-MCNC: 12 MG/DL — SIGNIFICANT CHANGE UP (ref 7–23)
CALCIUM SERPL-MCNC: 9.6 MG/DL — SIGNIFICANT CHANGE UP (ref 8.4–10.5)
CHLORIDE SERPL-SCNC: 102 MMOL/L — SIGNIFICANT CHANGE UP (ref 98–107)
CO2 SERPL-SCNC: 28 MMOL/L — SIGNIFICANT CHANGE UP (ref 22–31)
CREAT SERPL-MCNC: 0.93 MG/DL — SIGNIFICANT CHANGE UP (ref 0.5–1.3)
EOSINOPHIL # BLD AUTO: 0.19 K/UL — SIGNIFICANT CHANGE UP (ref 0–0.5)
EOSINOPHIL NFR BLD AUTO: 2.1 % — SIGNIFICANT CHANGE UP (ref 0–6)
ETHANOL BLD-MCNC: < 10 MG/DL — SIGNIFICANT CHANGE UP
GLUCOSE SERPL-MCNC: 111 MG/DL — HIGH (ref 70–99)
HCT VFR BLD CALC: 36.8 % — SIGNIFICANT CHANGE UP (ref 34.5–45)
HGB BLD-MCNC: 11.9 G/DL — SIGNIFICANT CHANGE UP (ref 11.5–15.5)
IMM GRANULOCYTES # BLD AUTO: 0.09 # — SIGNIFICANT CHANGE UP
IMM GRANULOCYTES NFR BLD AUTO: 1 % — SIGNIFICANT CHANGE UP (ref 0–1.5)
LYMPHOCYTES # BLD AUTO: 2.95 K/UL — SIGNIFICANT CHANGE UP (ref 1–3.3)
LYMPHOCYTES # BLD AUTO: 32.4 % — SIGNIFICANT CHANGE UP (ref 13–44)
MCHC RBC-ENTMCNC: 30 PG — SIGNIFICANT CHANGE UP (ref 27–34)
MCHC RBC-ENTMCNC: 32.3 % — SIGNIFICANT CHANGE UP (ref 32–36)
MCV RBC AUTO: 92.7 FL — SIGNIFICANT CHANGE UP (ref 80–100)
MONOCYTES # BLD AUTO: 0.72 K/UL — SIGNIFICANT CHANGE UP (ref 0–0.9)
MONOCYTES NFR BLD AUTO: 7.9 % — SIGNIFICANT CHANGE UP (ref 2–14)
NEUTROPHILS # BLD AUTO: 5.11 K/UL — SIGNIFICANT CHANGE UP (ref 1.8–7.4)
NEUTROPHILS NFR BLD AUTO: 56.1 % — SIGNIFICANT CHANGE UP (ref 43–77)
NRBC # FLD: 0 — SIGNIFICANT CHANGE UP
PLATELET # BLD AUTO: 303 K/UL — SIGNIFICANT CHANGE UP (ref 150–400)
PMV BLD: 9.4 FL — SIGNIFICANT CHANGE UP (ref 7–13)
POTASSIUM SERPL-MCNC: 4.1 MMOL/L — SIGNIFICANT CHANGE UP (ref 3.5–5.3)
POTASSIUM SERPL-SCNC: 4.1 MMOL/L — SIGNIFICANT CHANGE UP (ref 3.5–5.3)
PROT SERPL-MCNC: 6.5 G/DL — SIGNIFICANT CHANGE UP (ref 6–8.3)
RBC # BLD: 3.97 M/UL — SIGNIFICANT CHANGE UP (ref 3.8–5.2)
RBC # FLD: 14 % — SIGNIFICANT CHANGE UP (ref 10.3–14.5)
SALICYLATES SERPL-MCNC: < 5 MG/DL — LOW (ref 15–30)
SODIUM SERPL-SCNC: 139 MMOL/L — SIGNIFICANT CHANGE UP (ref 135–145)
TSH SERPL-MCNC: 2.54 UIU/ML — SIGNIFICANT CHANGE UP (ref 0.27–4.2)
VALPROATE SERPL-MCNC: < 3.2 UG/ML — LOW (ref 50–100)
WBC # BLD: 9.11 K/UL — SIGNIFICANT CHANGE UP (ref 3.8–10.5)
WBC # FLD AUTO: 9.11 K/UL — SIGNIFICANT CHANGE UP (ref 3.8–10.5)

## 2018-07-20 PROCEDURE — 99285 EMERGENCY DEPT VISIT HI MDM: CPT

## 2018-07-20 RX ORDER — VENLAFAXINE HCL 75 MG
75 CAPSULE, EXT RELEASE 24 HR ORAL DAILY
Qty: 0 | Refills: 0 | Status: DISCONTINUED | OUTPATIENT
Start: 2018-07-21 | End: 2018-07-23

## 2018-07-20 RX ORDER — BUDESONIDE AND FORMOTEROL FUMARATE DIHYDRATE 160; 4.5 UG/1; UG/1
2 AEROSOL RESPIRATORY (INHALATION)
Qty: 0 | Refills: 0 | Status: DISCONTINUED | OUTPATIENT
Start: 2018-07-21 | End: 2018-07-23

## 2018-07-20 RX ORDER — LEVOTHYROXINE SODIUM 125 MCG
75 TABLET ORAL DAILY
Qty: 0 | Refills: 0 | Status: DISCONTINUED | OUTPATIENT
Start: 2018-07-21 | End: 2018-07-23

## 2018-07-20 RX ORDER — LITHIUM CARBONATE 300 MG/1
450 TABLET, EXTENDED RELEASE ORAL
Qty: 0 | Refills: 0 | Status: DISCONTINUED | OUTPATIENT
Start: 2018-07-21 | End: 2018-07-23

## 2018-07-20 RX ORDER — PROPRANOLOL HCL 160 MG
10 CAPSULE, EXTENDED RELEASE 24HR ORAL THREE TIMES A DAY
Qty: 0 | Refills: 0 | Status: DISCONTINUED | OUTPATIENT
Start: 2018-07-21 | End: 2018-07-23

## 2018-07-20 RX ORDER — MONTELUKAST 4 MG/1
10 TABLET, CHEWABLE ORAL DAILY
Qty: 0 | Refills: 0 | Status: DISCONTINUED | OUTPATIENT
Start: 2018-07-21 | End: 2018-07-23

## 2018-07-20 RX ORDER — ALBUTEROL 90 UG/1
2 AEROSOL, METERED ORAL EVERY 6 HOURS
Qty: 0 | Refills: 0 | Status: DISCONTINUED | OUTPATIENT
Start: 2018-07-21 | End: 2018-07-23

## 2018-07-20 NOTE — ED BEHAVIORAL HEALTH ASSESSMENT NOTE - RISK ASSESSMENT
Risk factors include depressed mood, anhedonia, hopelessness, severe anxiety, cannabis use and past history of suicide attempt and past psychiatric hospitalizations.  Precipitating factor seems to be recent change in medications with Lithium stopped and ECT started.  Protective factors include supportive family and positive therapeutic relationship with psychiatrist.  Patient with acute SI with stated plan to overdose and represents an acute risk to self at this time. Risk factors include depressed mood, anhedonia, hopelessness, severe anxiety, cannabis use and past history of suicide attempt and past psychiatric hospitalizations.   Protective factors include supportive family and positive therapeutic relationship with psychiatrist.  Patient with acute SI with stated plan to overdose and represents an acute risk to self at this time.
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
## 2018-07-20 NOTE — ED BEHAVIORAL HEALTH ASSESSMENT NOTE - DESCRIPTION (FIRST USE, LAST USE, QUANTITY, FREQUENCY, DURATION)
reports long term use, approximately 2-3 times/week patient took 7mg of Klonopin today (she's prescribed 0.5mg prn by her OP psychiatrist Dr. Daniels)

## 2018-07-20 NOTE — ED PROVIDER NOTE - CARE PLAN
Principal Discharge DX:	Severe episode of recurrent major depressive disorder, without psychotic features  Secondary Diagnosis:	PTSD (post-traumatic stress disorder)

## 2018-07-20 NOTE — ED ADULT NURSE NOTE - OBJECTIVE STATEMENT
Received pt in  pt verbalizing depression & Si, pt states she ingested 7mg of Klonopin around 1730 denies abdominal pain n/v dizziness  at present eval on going.

## 2018-07-20 NOTE — ED BEHAVIORAL HEALTH ASSESSMENT NOTE - OTHER PAST PSYCHIATRIC HISTORY (INCLUDE DETAILS REGARDING ONSET, COURSE OF ILLNESS, INPATIENT/OUTPATIENT TREATMENT)
Reported history of MDD with psychosis, PTSD with multiple past psychiatric hospitalizations since her 30's, including at [facility] most recently in 2015, previously at [facility], approximately 10 past hospitalizations.  Past history of overdoses and past history of cutting behavior, most recently 5 months ago.  Multiple past ECT treatments, most recently received treatment #1 and #2 last week, most recently 5 days ago, past history of good response in 2008, 2009 and 2013.  Sees outpatient psychiatrist, Dr. Daniels and therapist, Belgica Gray regularly. Reported history of MDD with psychosis, PTSD with multiple past psychiatric hospitalizations since her 30's, including at [facility] most recently in 2015, previously at [facility], approximately 10 past hospitalizations.  Past history of overdoses and past history of cutting behavior.  Multiple past ECT treatments with good effect, past history of good response in 2008, 2009 and 2013.  Sees outpatient psychiatrist, Dr. Daniels and therapist, Belgica Gray regularly.

## 2018-07-20 NOTE — ED BEHAVIORAL HEALTH ASSESSMENT NOTE - DESCRIPTION
visibly anxious and tremulous, cooperative but has difficulty engaging in interview due to anxiety symptoms    Vital Signs Last 24 Hrs  T(C): 36.7 (02 Jan 2018 20:15), Max: 36.7 (02 Jan 2018 20:15)  T(F): 98 (02 Jan 2018 20:15), Max: 98 (02 Jan 2018 20:15)  HR: 100 (02 Jan 2018 20:15) (100 - 100)  BP: 125/98 (02 Jan 2018 20:15) (125/98 - 125/98)  BP(mean): --  RR: 18 (02 Jan 2018 20:15) (18 - 18)  SpO2: 97% (02 Jan 2018 20:15) (97% - 97%) asthma, hypothyroidism, urinary bladder dysfunction Lives with  and daughter, son is in college in [place], received a masters, prior work as a guidance counselor. anxious and tearful, cooperative

## 2018-07-20 NOTE — ED PROVIDER NOTE - OBJECTIVE STATEMENT
49 y/o F hx Depression, PTSD BIB   w c/o worsening depressive symptoms and suicidal ideations . Admits to multiple social stressors and  is thinking about  overdosing on pills. Denies falling, punching   or kicking any objects.  Denies pain,  SOB, fever, chills, chest/ abdominal discomfort. Denies HI/AH/VH.  Denies recent use of alcohol or illicit drug use. No evidence of physical injuries, broken skin or deformities.

## 2018-07-20 NOTE — ED PROVIDER NOTE - MEDICAL DECISION MAKING DETAILS
47 y/o F hx Depression, PTSD  Labs, Urine Tox/UA, HCG.   Medical evaluation performed. There is no clinical evidence of intoxication or any acute medical problem requiring immediate intervention. Patient is awaiting psychiatric consultation. Final disposition will be determined by psychiatrist.
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
## 2018-07-20 NOTE — ED BEHAVIORAL HEALTH NOTE - BEHAVIORAL HEALTH NOTE
The patient has been admitted to [facility] 5 times, most recently in 2013. She has had numerous courses of ECT. Writer spoke with patient’s , Zaid Rodgers, [phone], in the [facility] ED, for collateral information. He reported the following:    The patient and informant reside together, and have 2 grown children. The patient is in OP treatment with Dr. Daniels, [phone], and has a new therapist, details unknown. The patient saw Dr. Daniels today and verbalized suicidal ideation, so the doctor sent the patient to the [facility] ED, where the informant drove her.   Upon the informant’s arrival home today, he found the patient sleeping and very difficult to rouse. He suspects she may have over-medicated herself, but does not have information other than her somnolent state. She slept on the way in the car. The patient has been increasingly depressed in this episode for the past month, with sad mood. She did not eat for a few days earlier this week, but her appetite has returned. She did not sleep for 60 hours either earlier this week, but then slept for 24 hours. She has not been able to return to work since 5 years ago, but is able to tend to hygiene and some household chores. Though the triage note for today’s episode reports A/H, the patient has not reported any to the informant, and he has not noticed her laughing or talking to herself inappropriately. She has not expressed delusions, though she has been verbalizing worries that the informant is judging her or is angry with her. The patient told the informant that she is having suicidal ideation, with a plan, but refused to reveal the plan to the informant. She typically will only reveal details of suicidality with treatment providers, and reportedly did so with Dr. Cortes. She has not been aggressive or violent with others, nor expressed thoughts of such behavior. She does not have access to a gun. She had stopped using marijuana, but started using again 2-3 weeks ago, twice daily. She does not use other substances. The patient has been having occasional panic attacks.     The patient was scheduled to begin magnetic therapy, but was told the insurance company did not yet approve the treatment until she was on the way to the [facility] ED today, when the couple received a message stating it had just been approved. The informant stated he did not have a current list of medications, and that the patient uses [facility]. He believed the patient is aware of all of her medications. There have been recent medication adjustments.     The informant identified a possible precipitant. The patient recently did an ancestry search regarding her father, and was hoping he was not truly her father. She discovered he is her real father, and has not been happy about it.     Writer obtained a bed on [facility] at [facility], informing Mr. Rodgers of same, providing information about he unit.

## 2018-07-20 NOTE — ED BEHAVIORAL HEALTH ASSESSMENT NOTE - SUMMARY
This is a 46 yo   female, history of MDD, PTSD, personality disorder, multiple psychiatric hospitalizations, past history of overdose in a suicide attempt, in outpatient treatment with psychiatrist and therapist, recently started in outpatient ECT treatment last week with medication change of recent discontinuation of Lithium, brought to [facility] ED as recommended by Dr. Daniels, outpatient psychiatrist, for recommended inpatient treatment with acute suicidal ideation.  Patient with worsening of depressive symptoms since November and as a result had started ECT just one week ago, at the time when Lithium was also discontinued with reported worsening of symptoms since that time.  Patient endorses depressed mood, feelings of hopelessness, anhedonia, inappropriate guilt, low energy, worthlessness, poor sleep, poor appetite with acute SI with stated plan of overdose endorsed.  Patient reports vague somatic symptoms of not being able to taste correctly and generally "not feeling right" ever since her second ECT treatment, last Thursday, which resulted in patient cancelling continued ECT treatment.  Patient was just restarted on Lithium but continues to exhibit decompensation in mood symptoms with severe anxiety and depression and as a result was advised to come to the [facility] ER today for voluntary admission, recommended by psychiatrist.  Patient with severe depressive symptoms, overwhelming anxiety with recent panic attack symptoms with endorsed SI with plan to overdose present.  Patient represents an acute risk to self at this time and is agreeable to voluntary psychiatric hospitalization.  Admit to [address] on a 9.13, voluntary legal status.  No need for constant observation in a locked, supervised setting, patient able to contract for safety in hospital setting.  Patient transported to unit accompanied by security for safety. This is a 46 yo   female, history of MDD, PTSD, personality disorder, multiple psychiatric hospitalizations, past history suicide attempts by overdose, in outpatient treatment with psychiatrist and therapist, brought to [facility] ED as recommended by Dr. Daniels, outpatient psychiatrist, and by the patient's wish due to intense suicidal ideation intent and plan to OD in the context of worsening depression.  Patient has been adherent with psychotropic medications but is experiencing depressed mood, feelings of hopelessness, anhedonia, inappropriate guilt, low energy, worthlessness, poor sleep, poor appetite.  [facility] ER today for voluntary admission, recommended by psychiatrist.  Patient with severe depressive symptoms, overwhelming anxiety with recent panic attack symptoms with endorsed SI with plan to overdose present.  At this time the patient represents an acute risk to self at this time, and as she is agreeable to voluntary psychiatric hospitalization she will be admitted to [facility] under a 9.13, voluntary legal status.  Patient will not require CO 1:1 in the inpatient unit as she reports feeling safe in the hospital setting and is able to verbalize safety plan to alert staff if she has intentions to self-harm in the unit.  Her home medications will be re-started.  Given that she has had good effect with ECT in the past it would be advisable for this patient to undergo ECT treatment while she is inpatient.

## 2018-07-20 NOTE — ED BEHAVIORAL HEALTH ASSESSMENT NOTE - PAST PSYCHOTROPIC MEDICATION
Prozac, Zoloft, Lexapro, Remeron, Buspar, Wellbutrin, Seroquel, Abilify, Depakote, Lamictal, Risperdal

## 2018-07-20 NOTE — ED ADULT TRIAGE NOTE - CHIEF COMPLAINT QUOTE
Pt. c/o worsening depression, audible hallucinations and suicidal ideation with plan to overdose x 3-4 weeks.  Admits to cutting her left hand yesterday. Denies HI. States she has been compliant with medications. Last seen by psychiatrist 1 month ago. CHRISTOPHER Howard notified.

## 2018-07-20 NOTE — ED BEHAVIORAL HEALTH ASSESSMENT NOTE - CURRENT MEDICATION
Trazodone 100mg HS, Lithium 300mg BID, Effexor 300mg HS, Prazosin 3mg HS, Propranol, Klonopin 0.5mg BID, Synthroid 88mcg daily, Symbicort 160mg INh BID, Singulair 10mg HS, Myrbetriq 50mg HS, Vesicare 10mg AM, Centrum daily, Vitamin D daily, Trulicity injection weekly (received today, 1/2/18, as per patient.    I-Stop reviewed: Clonazepam 1mg, disp#60, 30 day supply by Dr. Carine Daniels on 12/14/17, Phentermine 15mg disp#60, 30 day supply by Isidro Strauss MD on 10/25/17 Effexor 300mg HS, Propranol, Klonopin 0.5mg BID, Lithium ER 450mg BID, Lamictal, Synthroid 88mcg daily, Symbicort 160mg INh BID, Singulair 10mg HS, Myrbetriq 50mg HS, Vesicare 10mg AM, Centrum daily, Vitamin D daily, Trulicity injection weekly (received today, 1/2/18, as per patient.    I-Stop reviewed: Clonazepam 1mg, disp#60, 30 day supply by Dr. Carine Daniels on 12/14/17, Phentermine 15mg disp#60, 30 day supply by Isidro Strauss MD on 10/25/17 Effexor 75mg, Propranol, Klonopin 0.5mg BID, Lithium ER 450mg BID, Lamictal, Synthroid 88mcg daily, Symbicort 160mg INh BID, Singulair 10mg HS, Myrbetriq 50mg HS, Vesicare 10mg AM, Centrum daily, Vitamin D daily,   I-Stop reviewed: Clonazepam 1mg, disp#60, 30 day supply by Dr. Carine Daniels on 12/14/17, Phentermine 15mg disp#60, 30 day supply by Isidro Strauss MD on 10/25/17 Effexor 75mg, Propranol, Klonopin 0.5mg BID, Lithium ER 450mg BID, Lamictal, Synthroid 88mcg daily, Symbicort 160mg INh BID, Singulair 10mg HS, Myrbetriq 50mg HS, Vesicare 10mg AM, Centrum daily, Vitamin D daily,   I-Stop reviewed: Clonazepam 0.5mg, disp#90, by Dr. Carine Daniels on 6/12/17 (ref #[id number])

## 2018-07-20 NOTE — ED BEHAVIORAL HEALTH ASSESSMENT NOTE - MEDICAL ISSUES AND PLAN (INCLUDE STANDING AND PRN MEDICATION)
currently her medical issues are stable and she's medically cleared in the ED medically stable / medically cleared in the ED

## 2018-07-20 NOTE — ED BEHAVIORAL HEALTH ASSESSMENT NOTE - SUBSTANCE ISSUES AND PLAN (INCLUDE STANDING AND PRN MEDICATION)
h/o cannabis abuse; pt ingested 7mg of Klonopin earlier today but has had stable vitals and doesn't have h/o BZD abuse thus no CIWA needed at this time
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
## 2018-07-21 PROBLEM — K82.9 DISEASE OF GALLBLADDER, UNSPECIFIED: Chronic | Status: ACTIVE | Noted: 2018-01-03

## 2018-07-21 PROBLEM — F43.10 POST-TRAUMATIC STRESS DISORDER, UNSPECIFIED: Chronic | Status: ACTIVE | Noted: 2018-01-03

## 2018-07-21 PROBLEM — F32.9 MAJOR DEPRESSIVE DISORDER, SINGLE EPISODE, UNSPECIFIED: Chronic | Status: ACTIVE | Noted: 2018-01-03

## 2018-07-21 PROCEDURE — 99221 1ST HOSP IP/OBS SF/LOW 40: CPT

## 2018-07-21 RX ORDER — LAMOTRIGINE 25 MG/1
100 TABLET, ORALLY DISINTEGRATING ORAL
Qty: 0 | Refills: 0 | Status: DISCONTINUED | OUTPATIENT
Start: 2018-07-21 | End: 2018-07-23

## 2018-07-21 RX ORDER — QUETIAPINE FUMARATE 200 MG/1
50 TABLET, FILM COATED ORAL AT BEDTIME
Qty: 0 | Refills: 0 | Status: DISCONTINUED | OUTPATIENT
Start: 2018-07-21 | End: 2018-07-22

## 2018-07-21 RX ADMIN — Medication 10 MILLIGRAM(S): at 15:28

## 2018-07-21 RX ADMIN — LAMOTRIGINE 100 MILLIGRAM(S): 25 TABLET, ORALLY DISINTEGRATING ORAL at 21:33

## 2018-07-21 RX ADMIN — Medication 75 MILLIGRAM(S): at 09:29

## 2018-07-21 RX ADMIN — Medication 1 TABLET(S): at 09:29

## 2018-07-21 RX ADMIN — BUDESONIDE AND FORMOTEROL FUMARATE DIHYDRATE 2 PUFF(S): 160; 4.5 AEROSOL RESPIRATORY (INHALATION) at 21:33

## 2018-07-21 RX ADMIN — QUETIAPINE FUMARATE 50 MILLIGRAM(S): 200 TABLET, FILM COATED ORAL at 21:33

## 2018-07-21 RX ADMIN — MONTELUKAST 10 MILLIGRAM(S): 4 TABLET, CHEWABLE ORAL at 09:29

## 2018-07-21 RX ADMIN — LITHIUM CARBONATE 450 MILLIGRAM(S): 300 TABLET, EXTENDED RELEASE ORAL at 09:29

## 2018-07-21 RX ADMIN — Medication 10 MILLIGRAM(S): at 09:29

## 2018-07-21 RX ADMIN — BUDESONIDE AND FORMOTEROL FUMARATE DIHYDRATE 2 PUFF(S): 160; 4.5 AEROSOL RESPIRATORY (INHALATION) at 09:32

## 2018-07-21 RX ADMIN — LITHIUM CARBONATE 450 MILLIGRAM(S): 300 TABLET, EXTENDED RELEASE ORAL at 21:33

## 2018-07-21 RX ADMIN — Medication 75 MICROGRAM(S): at 09:29

## 2018-07-21 RX ADMIN — Medication 10 MILLIGRAM(S): at 21:33

## 2018-07-21 NOTE — ED BEHAVIORAL HEALTH NOTE - BEHAVIORAL HEALTH NOTE
Writer called Jeff, [phone], to request authorization for [facility], registered the case with Denise on 7/20/16. Co-worker Licha Mercado provided clinical material to Carolina NATHAN, who provided authorization # [id number], for  da Writer called Jeff, [phone], to request authorization for [facility], registered the case with Denise on 7/20/16. Co-worker Licha Mercado provided clinical material to Carolina NATHAN on 7/21. Carolina provided authorization # [id number], for 5 days, 7/20/18-7/24/18, concurrent review due on Tuesday, 7/24/18, with staff member to be determined.

## 2018-07-22 PROCEDURE — 99232 SBSQ HOSP IP/OBS MODERATE 35: CPT

## 2018-07-22 RX ORDER — DIPHENHYDRAMINE HCL 50 MG
50 CAPSULE ORAL ONCE
Qty: 0 | Refills: 0 | Status: COMPLETED | OUTPATIENT
Start: 2018-07-22 | End: 2018-07-22

## 2018-07-22 RX ORDER — QUETIAPINE FUMARATE 200 MG/1
25 TABLET, FILM COATED ORAL AT BEDTIME
Qty: 0 | Refills: 0 | Status: DISCONTINUED | OUTPATIENT
Start: 2018-07-22 | End: 2018-07-23

## 2018-07-22 RX ORDER — HALOPERIDOL DECANOATE 100 MG/ML
5 INJECTION INTRAMUSCULAR ONCE
Qty: 0 | Refills: 0 | Status: COMPLETED | OUTPATIENT
Start: 2018-07-22 | End: 2018-07-22

## 2018-07-22 RX ADMIN — QUETIAPINE FUMARATE 25 MILLIGRAM(S): 200 TABLET, FILM COATED ORAL at 21:05

## 2018-07-22 RX ADMIN — Medication 1 MILLIGRAM(S): at 18:15

## 2018-07-22 RX ADMIN — Medication 10 MILLIGRAM(S): at 12:31

## 2018-07-22 RX ADMIN — HALOPERIDOL DECANOATE 5 MILLIGRAM(S): 100 INJECTION INTRAMUSCULAR at 19:13

## 2018-07-22 RX ADMIN — LAMOTRIGINE 100 MILLIGRAM(S): 25 TABLET, ORALLY DISINTEGRATING ORAL at 09:17

## 2018-07-22 RX ADMIN — Medication 75 MICROGRAM(S): at 09:17

## 2018-07-22 RX ADMIN — LITHIUM CARBONATE 450 MILLIGRAM(S): 300 TABLET, EXTENDED RELEASE ORAL at 21:05

## 2018-07-22 RX ADMIN — MONTELUKAST 10 MILLIGRAM(S): 4 TABLET, CHEWABLE ORAL at 09:17

## 2018-07-22 RX ADMIN — LITHIUM CARBONATE 450 MILLIGRAM(S): 300 TABLET, EXTENDED RELEASE ORAL at 09:17

## 2018-07-22 RX ADMIN — BUDESONIDE AND FORMOTEROL FUMARATE DIHYDRATE 2 PUFF(S): 160; 4.5 AEROSOL RESPIRATORY (INHALATION) at 09:18

## 2018-07-22 RX ADMIN — Medication 10 MILLIGRAM(S): at 09:18

## 2018-07-22 RX ADMIN — Medication 50 MILLIGRAM(S): at 19:12

## 2018-07-22 RX ADMIN — LAMOTRIGINE 100 MILLIGRAM(S): 25 TABLET, ORALLY DISINTEGRATING ORAL at 21:05

## 2018-07-22 RX ADMIN — BUDESONIDE AND FORMOTEROL FUMARATE DIHYDRATE 2 PUFF(S): 160; 4.5 AEROSOL RESPIRATORY (INHALATION) at 21:05

## 2018-07-22 RX ADMIN — Medication 75 MILLIGRAM(S): at 09:18

## 2018-07-22 RX ADMIN — Medication 10 MILLIGRAM(S): at 21:05

## 2018-07-22 RX ADMIN — Medication 1 TABLET(S): at 09:17

## 2018-07-23 VITALS
HEART RATE: 69 BPM | RESPIRATION RATE: 14 BRPM | SYSTOLIC BLOOD PRESSURE: 131 MMHG | TEMPERATURE: 98 F | DIASTOLIC BLOOD PRESSURE: 73 MMHG

## 2018-07-23 PROCEDURE — 99238 HOSP IP/OBS DSCHRG MGMT 30/<: CPT

## 2018-07-23 RX ORDER — QUETIAPINE FUMARATE 200 MG/1
1 TABLET, FILM COATED ORAL
Qty: 30 | Refills: 0 | OUTPATIENT
Start: 2018-07-23 | End: 2018-08-21

## 2018-07-23 RX ORDER — LAMOTRIGINE 25 MG/1
1 TABLET, ORALLY DISINTEGRATING ORAL
Qty: 0 | Refills: 0 | COMMUNITY
Start: 2018-07-23

## 2018-07-23 RX ADMIN — Medication 75 MILLIGRAM(S): at 11:05

## 2018-07-23 RX ADMIN — BUDESONIDE AND FORMOTEROL FUMARATE DIHYDRATE 2 PUFF(S): 160; 4.5 AEROSOL RESPIRATORY (INHALATION) at 11:05

## 2018-07-23 RX ADMIN — LAMOTRIGINE 100 MILLIGRAM(S): 25 TABLET, ORALLY DISINTEGRATING ORAL at 11:05

## 2018-07-23 RX ADMIN — Medication 10 MILLIGRAM(S): at 11:05

## 2018-07-23 RX ADMIN — MONTELUKAST 10 MILLIGRAM(S): 4 TABLET, CHEWABLE ORAL at 11:05

## 2018-07-23 RX ADMIN — Medication 1 TABLET(S): at 11:05

## 2018-07-23 RX ADMIN — Medication 75 MICROGRAM(S): at 11:05

## 2018-07-23 RX ADMIN — LITHIUM CARBONATE 450 MILLIGRAM(S): 300 TABLET, EXTENDED RELEASE ORAL at 11:05

## 2018-07-27 PROBLEM — E66.9 OBESITY, UNSPECIFIED OBESITY SEVERITY, UNSPECIFIED OBESITY TYPE: Status: ACTIVE | Noted: 2018-02-16

## 2018-09-05 ENCOUNTER — APPOINTMENT (OUTPATIENT)
Dept: UROGYNECOLOGY | Facility: CLINIC | Age: 48
End: 2018-09-05

## 2018-09-20 ENCOUNTER — APPOINTMENT (OUTPATIENT)
Dept: UROGYNECOLOGY | Facility: HOSPITAL | Age: 48
End: 2018-09-20

## 2018-10-08 ENCOUNTER — APPOINTMENT (OUTPATIENT)
Dept: UROGYNECOLOGY | Facility: CLINIC | Age: 48
End: 2018-10-08

## 2019-07-07 ENCOUNTER — FORM ENCOUNTER (OUTPATIENT)
Age: 49
End: 2019-07-07

## 2019-08-21 ENCOUNTER — APPOINTMENT (OUTPATIENT)
Dept: ORTHOPEDIC SURGERY | Facility: CLINIC | Age: 49
End: 2019-08-21
Payer: COMMERCIAL

## 2019-08-21 VITALS
HEART RATE: 64 BPM | SYSTOLIC BLOOD PRESSURE: 114 MMHG | BODY MASS INDEX: 43.4 KG/M2 | HEIGHT: 69 IN | WEIGHT: 293 LBS | DIASTOLIC BLOOD PRESSURE: 72 MMHG

## 2019-08-21 DIAGNOSIS — M23.91 UNSPECIFIED INTERNAL DERANGEMENT OF RIGHT KNEE: ICD-10-CM

## 2019-08-21 DIAGNOSIS — M22.2X1 PATELLOFEMORAL DISORDERS, RIGHT KNEE: ICD-10-CM

## 2019-08-21 PROCEDURE — 99204 OFFICE O/P NEW MOD 45 MIN: CPT | Mod: 25

## 2019-08-21 PROCEDURE — 20610 DRAIN/INJ JOINT/BURSA W/O US: CPT | Mod: RT

## 2019-08-21 RX ORDER — METHYLPRED ACET/NACL,ISO-OS/PF 40 MG/ML
40 VIAL (ML) INJECTION
Qty: 1 | Refills: 0 | Status: COMPLETED | OUTPATIENT
Start: 2019-08-21

## 2019-08-21 RX ORDER — LIDOCAINE HYDROCHLORIDE 10 MG/ML
1 INJECTION, SOLUTION INFILTRATION; PERINEURAL
Refills: 0 | Status: COMPLETED | OUTPATIENT
Start: 2019-08-21

## 2019-08-21 RX ADMIN — Medication MG/ML: at 00:00

## 2019-08-21 RX ADMIN — LIDOCAINE HYDROCHLORIDE %: 10 INJECTION, SOLUTION INFILTRATION; PERINEURAL at 00:00

## 2019-09-03 ENCOUNTER — APPOINTMENT (OUTPATIENT)
Dept: MRI IMAGING | Facility: CLINIC | Age: 49
End: 2019-09-03
Payer: COMMERCIAL

## 2019-09-04 ENCOUNTER — FORM ENCOUNTER (OUTPATIENT)
Age: 49
End: 2019-09-04

## 2019-09-05 ENCOUNTER — OUTPATIENT (OUTPATIENT)
Dept: OUTPATIENT SERVICES | Facility: HOSPITAL | Age: 49
LOS: 1 days | End: 2019-09-05
Payer: COMMERCIAL

## 2019-09-05 ENCOUNTER — APPOINTMENT (OUTPATIENT)
Dept: MRI IMAGING | Facility: CLINIC | Age: 49
End: 2019-09-05
Payer: COMMERCIAL

## 2019-09-05 DIAGNOSIS — Z00.8 ENCOUNTER FOR OTHER GENERAL EXAMINATION: ICD-10-CM

## 2019-09-05 DIAGNOSIS — Z90.49 ACQUIRED ABSENCE OF OTHER SPECIFIED PARTS OF DIGESTIVE TRACT: Chronic | ICD-10-CM

## 2019-09-05 PROCEDURE — 73721 MRI JNT OF LWR EXTRE W/O DYE: CPT | Mod: 26,RT

## 2019-09-05 PROCEDURE — 73721 MRI JNT OF LWR EXTRE W/O DYE: CPT

## 2019-09-11 ENCOUNTER — APPOINTMENT (OUTPATIENT)
Dept: ORTHOPEDIC SURGERY | Facility: CLINIC | Age: 49
End: 2019-09-11
Payer: COMMERCIAL

## 2019-09-11 VITALS
WEIGHT: 293 LBS | DIASTOLIC BLOOD PRESSURE: 84 MMHG | SYSTOLIC BLOOD PRESSURE: 126 MMHG | BODY MASS INDEX: 43.4 KG/M2 | HEART RATE: 73 BPM | HEIGHT: 69 IN

## 2019-09-11 PROCEDURE — 99213 OFFICE O/P EST LOW 20 MIN: CPT

## 2019-10-16 ENCOUNTER — APPOINTMENT (OUTPATIENT)
Dept: ORTHOPEDIC SURGERY | Facility: CLINIC | Age: 49
End: 2019-10-16
Payer: COMMERCIAL

## 2019-10-16 PROCEDURE — 20610 DRAIN/INJ JOINT/BURSA W/O US: CPT | Mod: RT

## 2019-10-17 RX ORDER — HYALURONATE SODIUM 30 MG/2 ML
30 SYRINGE (ML) INTRAARTICULAR
Refills: 0 | Status: COMPLETED | OUTPATIENT
Start: 2019-10-17

## 2019-10-17 RX ADMIN — Medication 2 MG/2ML: at 00:00

## 2019-10-23 ENCOUNTER — APPOINTMENT (OUTPATIENT)
Dept: ORTHOPEDIC SURGERY | Facility: CLINIC | Age: 49
End: 2019-10-23
Payer: COMMERCIAL

## 2019-10-23 VITALS
BODY MASS INDEX: 43.4 KG/M2 | WEIGHT: 293 LBS | SYSTOLIC BLOOD PRESSURE: 127 MMHG | HEIGHT: 69 IN | DIASTOLIC BLOOD PRESSURE: 82 MMHG | HEART RATE: 68 BPM

## 2019-10-23 PROCEDURE — 20610 DRAIN/INJ JOINT/BURSA W/O US: CPT | Mod: RT

## 2019-10-24 RX ORDER — HYALURONATE SODIUM 30 MG/2 ML
30 SYRINGE (ML) INTRAARTICULAR
Refills: 0 | Status: COMPLETED | OUTPATIENT
Start: 2019-10-24

## 2019-10-24 RX ADMIN — Medication 2 MG/2ML: at 00:00

## 2019-10-25 NOTE — ED BEHAVIORAL HEALTH ASSESSMENT NOTE - NS ED BHA PLAN HANDOFF TO INPATIENT PROVIDER YESNO
Patient stable overnight per staff, this morning ate breakfast completely. Denies H/A, dizziness, difficulty sleeping. Not agitated, although continues to become more confused, restless late afternoon Yes

## 2019-10-28 NOTE — PHYSICAL EXAM
[de-identified] : \par \par The right knee has range of motion from 0-130°. Pain with terminal flexion localized to the patellofemoral joint tenderness noted over the patellofemoral retinaculum.. There is tenderness at the medial joint. There is a mild effusion. There is no soft tissue swelling, warmth, or erythema.   There is a negative Lachman sign.  There is no instability to varus/valgus stress or anterior/posterior drawer.  There is normal strength in the in the quadriceps and hamstring muscles.  Sensation is intact to the lower estremity. There are normal pulses distally and good capillary refill. No edema or lymphadenopathy noted.  \par

## 2019-10-28 NOTE — PROCEDURE
[de-identified] : A 22gauge needle was sterilely placed onto the syringe of Orthovisc.  The right knee was then sterilely prepped with chlorhexidine and ethylene chloride spray was used as an anesthetic just prior to the injection.   The Orthovisc was injected into the knee joint just above and lateral to the patella into the suprapatellar pouch.  The patient tolerated the procedure well without difficulty.  The patient was given instructions on the use of ice and anti-inflammatories for post injection site soreness.  \par

## 2019-10-28 NOTE — HISTORY OF PRESENT ILLNESS
[de-identified] : This patient presents today for a second Orthovisc injection to the right knee. The patient is a 50% improvement since the first injection. Pain level has decreased to 2 and a 10. No complaints noted after the first injection.

## 2019-10-28 NOTE — DISCUSSION/SUMMARY
[de-identified] : The injection was tolerated without difficulty. Instructions were given postinjection regarding ice and analgesics. I will see the patient back in one week for the next injection.

## 2019-10-28 NOTE — REASON FOR VISIT
[Follow-Up Visit] : a follow-up visit for [[email or web address]] : Primary osteoarthritis of right knee. Second Orthovisc injection to right knee.

## 2019-10-30 ENCOUNTER — APPOINTMENT (OUTPATIENT)
Dept: ORTHOPEDIC SURGERY | Facility: CLINIC | Age: 49
End: 2019-10-30
Payer: COMMERCIAL

## 2019-10-30 VITALS
DIASTOLIC BLOOD PRESSURE: 81 MMHG | BODY MASS INDEX: 43.4 KG/M2 | SYSTOLIC BLOOD PRESSURE: 126 MMHG | HEIGHT: 69 IN | WEIGHT: 293 LBS | HEART RATE: 74 BPM

## 2019-10-30 PROCEDURE — 20610 DRAIN/INJ JOINT/BURSA W/O US: CPT | Mod: RT

## 2019-11-06 ENCOUNTER — APPOINTMENT (OUTPATIENT)
Dept: ORTHOPEDIC SURGERY | Facility: CLINIC | Age: 49
End: 2019-11-06
Payer: COMMERCIAL

## 2019-11-06 VITALS
WEIGHT: 293 LBS | SYSTOLIC BLOOD PRESSURE: 129 MMHG | HEIGHT: 69 IN | BODY MASS INDEX: 43.4 KG/M2 | HEART RATE: 59 BPM | DIASTOLIC BLOOD PRESSURE: 77 MMHG

## 2019-11-06 DIAGNOSIS — M17.11 UNILATERAL PRIMARY OSTEOARTHRITIS, RIGHT KNEE: ICD-10-CM

## 2019-11-06 PROCEDURE — 20610 DRAIN/INJ JOINT/BURSA W/O US: CPT | Mod: RT

## 2019-11-06 RX ORDER — HYALURONATE SODIUM 30 MG/2 ML
30 SYRINGE (ML) INTRAARTICULAR
Refills: 0 | Status: COMPLETED | OUTPATIENT
Start: 2019-11-06

## 2019-11-06 RX ADMIN — Medication 2 MG/2ML: at 00:00

## 2019-11-07 RX ORDER — HYALURONATE SODIUM 30 MG/2 ML
30 SYRINGE (ML) INTRAARTICULAR
Refills: 0 | Status: COMPLETED | OUTPATIENT
Start: 2019-11-07

## 2019-11-07 RX ADMIN — Medication 2 MG/2ML: at 00:00

## 2019-11-13 ENCOUNTER — FORM ENCOUNTER (OUTPATIENT)
Age: 49
End: 2019-11-13

## 2019-11-27 ENCOUNTER — APPOINTMENT (OUTPATIENT)
Dept: ORTHOPEDIC SURGERY | Facility: CLINIC | Age: 49
End: 2019-11-27

## 2019-12-08 ENCOUNTER — FORM ENCOUNTER (OUTPATIENT)
Age: 49
End: 2019-12-08

## 2020-06-25 ENCOUNTER — FORM ENCOUNTER (OUTPATIENT)
Age: 50
End: 2020-06-25

## 2020-08-16 ENCOUNTER — FORM ENCOUNTER (OUTPATIENT)
Age: 50
End: 2020-08-16

## 2021-02-15 ENCOUNTER — FORM ENCOUNTER (OUTPATIENT)
Age: 51
End: 2021-02-15

## 2021-05-24 ENCOUNTER — APPOINTMENT (OUTPATIENT)
Dept: OBGYN | Facility: CLINIC | Age: 51
End: 2021-05-24

## 2021-09-13 ENCOUNTER — APPOINTMENT (OUTPATIENT)
Dept: OBGYN | Facility: CLINIC | Age: 51
End: 2021-09-13
Payer: COMMERCIAL

## 2021-09-13 VITALS
SYSTOLIC BLOOD PRESSURE: 146 MMHG | WEIGHT: 167 LBS | DIASTOLIC BLOOD PRESSURE: 94 MMHG | HEIGHT: 68 IN | HEART RATE: 74 BPM | BODY MASS INDEX: 25.31 KG/M2

## 2021-09-13 PROCEDURE — 99396 PREV VISIT EST AGE 40-64: CPT | Mod: 25

## 2021-09-13 PROCEDURE — 76830 TRANSVAGINAL US NON-OB: CPT

## 2021-09-13 NOTE — HISTORY OF PRESENT ILLNESS
[Irregular Menstrual Interval] : irregular menstrual interval [FreeTextEntry1] : here for routine exam\par last [place] Visit:\par \par \par    	Print\par Patient\par Name	HALINA HI (49yo, F) ID# [id number]	Appt. Date/Time	2020 01:40PM\par 	1970	Service Dept.	[facility] OFF[facility]\par Provider	TRICIA WILD MD\par Insurance	\par Med Primary: GHI (PPO)\par Insurance # : [id number]\par Policy/Group # : [id number]\par Prescription: [employer] SCRIPTS - Member is eligible. details\par Prescription: [employer] SCRIPTS - Member is eligible. details\par Prescription: [employer] SCRIPTS - Member is eligible. details\par Chief Complaint\par Well Woman Visit\par Patient's Care Team\par Primary Care Provider: SARITA PAGAN MD: [address] [place] [address], Ph [phone], Fax [phone] NPI: [id number]\par Patient's Pharmacies\par [email or web address] #[id number] (ERX): [address] [place] [address], Ph [phone], Fax [phone]\par Vitals\par Ht:	5 ft 8 in 2020 01:55 pm\par Wt:	269 lbs 2020 01:55 pm\par BMI:	40.9 2020 01:55 pm\par BP:	126/80 sitting 2020 01:55 pm\par T:	97.3 F° 2020 01:56 pm\par Allergies\par Reviewed Allergies\par ASPIRIN	\par Medications\par Reviewed Medications\par Afluria Qd - (36 mos up)(PF)60 mcg (15 mcg x4)/0.5 mL IM syringe\par ADM 0.5ML IM UTD\par 19   filled	[facility]\par Afluria Quad [id number] (PF) 60 mcg (15 mcg x 4)/0.5 mL IM syringe\par 10/28/18   filled	[facility]\par albuterol sulfate 2.5 mg/3 mL (0.083 %) solution for nebulization\par 17   filled	[facility]\par Amitiza 24 mcg capsule\par 17   filled	[facility]\par amlodipine 5 mg-valsartan 160 mg tablet\par 20   filled	[facility]\par amoxicillin 500 mg capsule\par 20   filled	[facility]\par amoxicillin 875 mg tablet\par 17   filled	[facility]\par amoxicillin 875 mg-potassium clavulanate 125 mg tablet\par 17   filled	[facility]\par ARIPiprazole 10 mg tablet\par 10/30/19   filled	[facility]\par ARIPiprazole 2 mg tablet\par 19   filled	[facility]\par ARIPiprazole 5 mg tablet\par 20   filled	[facility]\par azithromycin 250 mg tablet\par 17   filled	[facility]\par baclofen 10 mg tablet\par 19   filled	[facility]\par BD Ultra-Fine Mini Pen Needle 31 gauge x 3/16"\par 18   filled	[facility]\par benzonatate 100 mg capsule\par 10/07/16   filled	[facility]\par benztropine 1 mg tablet\par 20   filled	[facility]\par butalbital-acetaminophen-caffeine 50 mg-300 mg-40 mg capsule\par 14   filled	[facility]\par cefaDROXiL 500 mg capsule\par 17   filled	[facility]\par cholestyramine (with sugar) 4 gram powder for susp in a packet\par MIX 1 PACKET INTO WATER OR NON CARBONATED BEVERAGE AND DRINK BID\par 20   filled	[facility]\par ciprofloxacin 500 mg tablet\par 18   filled	[facility]\par clindamycin 1 % topical gel\par 17   filled	[facility]\par clindamycin 1 %-benzoyl peroxide 5 % topical gel\par 18   filled	[facility]\par clobetasoL 0.05 % scalp solution\par 06/02/15   filled	[facility]\par clobetasoL 0.05 % shampoo\par 14   filled	[facility]\par clobetasoL 0.05 % topical foam\par 14   filled	[facility]\par clobetasoL-emollient 0.05 % topical foam\par 13   filled	[facility]\par clonazePAM 0.5 mg tablet\par 10/25/18   filled	[facility]\par clonazePAM 1 mg tablet\par 19   filled	[facility]\par cloNIDine HCL 0.1 mg tablet\par 20   filled	[facility]\par clotrimazole-betamethasone 1 %-0.05 % topical cream\par Apply 2 g twice a day by topical route as directed for 7 days.\par 17   prescribed	Tricia Wild MD\par Creon 36,000 unit-114,000 unit-180,000 unit capsule,delayed release\par 18   filled	[facility]\par cyclobenzaprine 10 mg tablet\par 02/07/15   filled	[facility]\par dapsone 5 % topical gel\par 19   filled	[facility]\par desoximetasone 0.25 % topical cream\par 14   filled	[facility]\par diclofenac sodium 75 mg tablet,delayed release\par 16   filled	[facility]\par dicyclomine 10 mg capsule\par 18   filled	[facility]\par dicyclomine 20 mg tablet\par 13   filled	[facility]\par diflorasone 0.05 % topical cream\par 11/28/15   filled	[facility]\par doxepin 10 mg capsule\par 01/20/15   filled	[facility]\par doxycycline monohydrate 100 mg capsule\par 07/25/15   filled	[facility]\par eszopiclone 1 mg tablet\par 10/25/18   filled	[facility]\par fluconazole 200 mg tablet\par Take 1 tablet(s) every day by oral route as directed for 3 days.\par 17   prescribed	Tricia Wild MD\par fluocinonide 0.05 % topical solution\par 14   filled	[facility]\par FLUoxetine 40 mg capsule\par 13   filled	[facility]\par fluticasone propionate 50 mcg/actuation nasal spray,suspension\par 08/15/13   filled	[facility]\par gabapentin 300 mg capsule\par 20   filled	[facility]\par Generlac 10 gram/15 mL oral solution\par 17   filled	[facility]\par glycopyrrolate 1 mg tablet\par 19   filled	[facility]\par halobetasol propionate 0.05 % topical cream\par 02/11/15   filled	[facility]\par lamoTRIgine 100 mg tablet\par 20   filled	[facility]\par lamoTRIgine 25 mg tablet\par 04/17/15   filled	[facility]\par Latuda 20 mg tablet\par 08/15/18   filled	[facility]\par levalbuteroL 0.63 mg/3 mL solution for nebulization\par 08/15/13   filled	[facility]\par levoFLOXacin 500 mg tablet\par 19   filled	[facility]\par lithium carbonate  mg tablet,extended release\par 10/25/18   filled	[facility]\par lithium carbonate  mg tablet,extended release\par 01/20/15   filled	[facility]\par meloxicam 15 mg tablet\par 19   filled	[facility]\par meloxicam 7.5 mg tablet\par 18   filled	[facility]\par methocarbamoL 500 mg tablet\par 20   filled	[facility]\par methylPREDNISolone 4 mg tablets in a dose pack\par 17   filled	[facility]\par metoclopramide 5 mg tablet\par 10/06/13   filled	[facility]\par metroNIDAZOLE 500 mg tablet\par 09/10/18   filled	[facility]\par mirtazapine 15 mg tablet\par 16   filled	[facility]\par mirtazapine 30 mg tablet\par 16   filled	[facility]\par montelukast 10 mg tablet\par 20   filled	[facility]\par mupirocin 2 % topical ointment\par 17   filled	[facility]\par Myrbetriq 50 mg tablet,extended release\par 17   filled	[facility]\par nabumetone 500 mg tablet\par 19   filled	[facility]\par Nasonex 50 mcg/actuation Spray\par 01/16/15   filled	[facility]\par norethindrone acetate 5 mg tablet\par Take 1 tablet(s) every day by oral route as directed for 28 days.\par 14   filled	[facility]\par Novofine 32 32 gauge x 1/4" needle\par 17   filled	[facility]\par omeprazole 40 mg capsule,delayed release\par 16   filled	[facility]\par ondansetron HCL 4 mg tablet\par 09/10/18   filled	[facility]\par oseltamivir 75 mg capsule\par 19   filled	[facility]\par oxyCODONE-acetaminophen 5 mg-325 mg tablet\par 17   filled	[facility]\par pantoprazole 40 mg tablet,delayed release\par 13   filled	[facility]\par prazosin 1 mg capsule\par 03/15/18   filled	[facility]\par predniSONE 20 mg tablet\par 19   filled	[facility]\par pregabalin 25 mg capsule\par 20   filled	[facility]\par prochlorperazine maleate 5 mg tablet\par 16   filled	[facility]\par promethazine 6.25 mg-codeine 10 mg/5 mL syrup\par 20   filled	[facility]\par promethazine-DM 6.25 mg-15 mg/5 mL oral syrup\par 10/05/15   filled	[facility]\par propranoloL 10 mg tablet\par 18   filled	[facility]\par propranoloL ER 80 mg capsule,24 hr,extended release\par 20   filled	[facility]\par QUEtiapine 25 mg tablet\par 18   filled	[facility]\par risperiDONE 0.5 mg tablet\par 18   filled	[facility]\par sertraline 100 mg tablet\par 14   filled	[facility]\par sertraline 50 mg tablet\par 13   filled	[facility]\par Symbicort 160 mcg-4.5 mcg/actuation HFA aerosol inhaler\par 20   filled	[facility]\par Synthroid 75 mcg tablet\par 09/02/15   filled	[facility]\par Synthroid 88 mcg tablet\par 20   filled	[facility]\par tobramycin 0.3 % eye drops\par 17   filled	[facility]\par topiramate 25 mg tablet\par 14   filled	[facility]\par Transderm-Scop 1.5 mg transdermal patch (1 mg over 3 days)\par 18   filled	[facility]\par traZODone 100 mg tablet\par 20   filled	[facility]\par triamcinolone acetonide 0.1 % topical cream\par 01/10/20   filled	[facility]\par Trulicity 1.5 mg/0.5 mL subcutaneous pen injector\par 18   filled	[facility]\par valACYclovir 1 gram tablet\par 19   filled	[facility]\par venlafaxine  mg capsule,extended release 24 hr\par 19   filled	[facility]\par venlafaxine ER 37.5 mg capsule,extended release 24 hr\par 19   filled	[facility]\par venlafaxine ER 75 mg capsule,extended release 24 hr\par 14   filled	[facility]\par Ventolin HFA 90 mcg/actuation aerosol inhaler\par 20   filled	[facility]\par Vesicare 5 mg tablet\par 08/10/17   filled	[facility]\par Victoza 2-Larry 0.6 mg/0.1 mL (18 mg/3 mL) subcutaneous pen injector\par 18   filled	[facility]\par Xifaxan 550 mg tablet\par 11/20/15   filled	[facility]\par Xopenex HFA 45 mcg/actuation aerosol inhaler\par 08/15/13   filled	[facility]\par zolpidem 5 mg tablet\par 11/12/15   filled	[facility]\par Problems\par Reviewed Problems\par Menorrhagia\par Irregular periods\par Increased libido\par Gynecologic examination\par Family History\par Reviewed Family History\par Mother	- Malignant tumor of lung\par - previously recorded as Lung Cancer\par  	- Malignant tumor of ovary\par  	- Alzheimer's disease\par Father	- Malignant tumor of lung\par - previously recorded as Lung Cancer\par Social History\par Reviewed Social History\par Routine Gyn\par Tobacco Smoking Status: Never smoker\par Non-smoker\par Smokeless Tobacco Status: Never used smokeless tobacco\par Tobacco-years of use: 0\par E-cigarette/Vape Status: Never used electronic cigarettes\par Most Recent Tobacco Use Screenin2020\par Surgical History\par Reviewed Surgical History\par Other - FX COCCYX WITH HEMATOMA\par Other - BLADDER\par GYN History\par Reviewed GYN History\par Duration of Flow (days): 7 (Notes: POURING BRIGHT RED WITH CLOTS).\par LMP: Definite.\par Frequency of Cycle (Q days): 28.\par Menses Monthly: Y.\par Flow: Heavy.\par Date of LMP: 2020.\par Obstetric History\par Reviewed Obstetric History\par TOTAL	FULL	PRE	AB. I	AB. S	ECTOPICS	MULTIPLE	LIVING\par 2	2						2\par \par Past Medical History\par Reviewed Past Medical History\par Asthma: Y\par Notes: BIPOLAR\par Screening\par None recorded.\par HPI\par routine gyn exam\par ROS\par Patient reports incontinence but reports no hematuria, no abnormal bleeding, no flank pain, no trouble urinating, no rash, no lesion, no discharge, no vaginal odor, and no vaginal itching. She reports no fatigue, no fever, no significant weight gain, and no significant weight loss. She reports no abnormal moles and no rashes. She reports no irritation and no vision changes. She reports no hearing loss, no ear pain, no nose/sinus problems, no sore throat, no snoring, no dry mouth, and no mouth ulcers. She reports no dyspnea / shortness of breath, no cough, no sputum production, no hemoptysis, and no wheezing. She reports no chest pain, no palpitations, and no orthopnea. She reports no heartburn, no dysphagia, no nausea, no vomiting, no abdominal pain, no bowel movement changes, no diarrhea, no constipation, and no rectal bleeding. She reports no menstrual problems and no PMDD symptoms. She reports no menopausal symptoms. She reports no sexual problems. She reports no muscle aches, no muscle weakness, no arthralgias/joint pain, and no back pain. She reports no headaches, no dizziness, no LOC, no weakness, no numbness, and no seizures. She reports no depression, no alcoholism, and no sleep disturbances.\par Physical Exam\par Patient is a 50-year-old female.\par \par Chaperone: Chaperone: present.\par \par Female Genitalia: Vulva: no masses, atrophy, or lesions. Bladder/Urethra: no urethral discharge or mass and normal meatus and bladder non distended. Vagina no tenderness, erythema, cystocele, rectocele, abnormal vaginal discharge, or vesicle(s) or ulcers. Cervix: no discharge or cervical motion tenderness and grossly normal. Uterus: normal size and shape and midline, mobile, non-tender, and no uterine prolapse. Adnexa/Parametria: no parametrial tenderness or mass and no adnexal tenderness or ovarian mass.\par \par Breast: Inspection/Palpation: no erythema, induration, tenderness, skin changes, abnormal secretions, or distinct masses and normal nipple appearance and non tender axillary lymph nodes.\par \par Abdomen: Auscultation/Inspection/Palpation: no tenderness, hepatomegaly, splenomegaly, masses, or CVA tenderness and soft, non-distended, and normal bowel sounds. Hernia: none palpated.\par Assessment / Plan\par 1. Gynecologic examination\par Z01.411: Encounter for gynecological examination (general) (routine) with abnormal findings\par PAP, LB\par MAMMO, SCREENING, BILATERAL\par \par 2. Family history of malignant neoplasm of ovary\par Z80.41: Family history of malignant neoplasm of ovary\par \par 3. Urinary incontinence -\par urogyn\par \par R32: Unspecified urinary incontinence\par \par 4. Hypertrophy of uterus -\par hx of ruptured cyst\par \par all ok\par \par ut enlarged\par \par N85.2: Hypertrophy of uterus\par US, TRANSVAGINAL\par \par US, TRANSVAGINAL\par \par Review of us, transvaginal taken on 2020 at [facility] OFF[facility] shows:\par Imaging Studies:\par Indications: ovarian cyst.\par Uterus: volume (cc): 141.\par Endometrium: thickness 6.0 mm.\par Cervix: normal.\par Cul de sac: no fluid was demonstrated.\par Right Ovary: volume (cc): 3.1.\par Left Ovary: volume (cc): 5.3.\par \par Return to Office\par None recorded.\par Encounter Sign-Off\par Encounter signed-off by Tricia Wild MD, 2020.\par Encounter performed and documented by Tricia Wild MD\par Encounter reviewed & signed by Tricia Wild MD on 2020 at 2:30pm:\par  [TextBox_28] : spreading out

## 2021-09-13 NOTE — PROCEDURE
[Abnormal Uterine Bleeding] : abnormal uterine bleeding [Transvaginal Ultrasound] : transvaginal ultrasound [FreeTextEntry3] : much smaller\par cevix normal\par no free fluid [FreeTextEntry5] : 86cc volume/   8mm [FreeTextEntry7] : 6.8cc [FreeTextEntry8] : 1.1cc

## 2021-09-18 LAB
C TRACH RRNA SPEC QL NAA+PROBE: NOT DETECTED
HPV HIGH+LOW RISK DNA PNL CVX: NOT DETECTED
N GONORRHOEA RRNA SPEC QL NAA+PROBE: NOT DETECTED
SOURCE AMPLIFICATION: NORMAL

## 2021-09-24 LAB — CYTOLOGY CVX/VAG DOC THIN PREP: ABNORMAL

## 2021-11-15 ENCOUNTER — NON-APPOINTMENT (OUTPATIENT)
Age: 51
End: 2021-11-15

## 2022-04-27 ENCOUNTER — NON-APPOINTMENT (OUTPATIENT)
Age: 52
End: 2022-04-27

## 2022-05-16 ENCOUNTER — NON-APPOINTMENT (OUTPATIENT)
Age: 52
End: 2022-05-16

## 2022-09-19 ENCOUNTER — APPOINTMENT (OUTPATIENT)
Dept: OBGYN | Facility: CLINIC | Age: 52
End: 2022-09-19

## 2023-02-06 ENCOUNTER — APPOINTMENT (OUTPATIENT)
Dept: OBGYN | Facility: CLINIC | Age: 53
End: 2023-02-06
Payer: COMMERCIAL

## 2023-02-06 VITALS
HEIGHT: 68 IN | HEART RATE: 107 BPM | WEIGHT: 159 LBS | SYSTOLIC BLOOD PRESSURE: 127 MMHG | DIASTOLIC BLOOD PRESSURE: 91 MMHG | BODY MASS INDEX: 24.1 KG/M2

## 2023-02-06 DIAGNOSIS — N85.2 HYPERTROPHY OF UTERUS: ICD-10-CM

## 2023-02-06 DIAGNOSIS — N92.6 IRREGULAR MENSTRUATION, UNSPECIFIED: ICD-10-CM

## 2023-02-06 PROCEDURE — 99396 PREV VISIT EST AGE 40-64: CPT | Mod: 25

## 2023-02-06 PROCEDURE — 76830 TRANSVAGINAL US NON-OB: CPT

## 2023-02-06 NOTE — PROCEDURE
[Abnormal Uterine Bleeding] : abnormal uterine bleeding [Transvaginal Ultrasound] : transvaginal ultrasound [FreeTextEntry3] : lining thin\par no blood buildup\par no free fluid\par cervix normal [FreeTextEntry5] : 135 cc vol,   2.4 mm thick...no blood [FreeTextEntry7] : 7.1 cc [FreeTextEntry8] : 6.8 cc

## 2023-02-06 NOTE — REVIEW OF SYSTEMS
[Recent Weight Loss (___ Lbs)] : recent [unfilled] ~Ulb weight loss [Abn Vaginal bleeding] : abnormal vaginal bleeding [Negative] : Breast

## 2023-02-13 LAB
CYTOLOGY CVX/VAG DOC THIN PREP: NORMAL
HPV HIGH+LOW RISK DNA PNL CVX: NOT DETECTED

## 2023-09-13 DIAGNOSIS — Z12.39 ENCOUNTER FOR OTHER SCREENING FOR MALIGNANT NEOPLASM OF BREAST: ICD-10-CM

## 2023-09-13 NOTE — ED BEHAVIORAL HEALTH ASSESSMENT NOTE - AXIS III
Bilobed Transposition Flap Text: The defect edges were debeveled with a #15 scalpel blade.  Given the location of the defect and the proximity to free margins a bilobed transposition flap was deemed most appropriate.  Using a sterile surgical marker, an appropriate bilobe flap drawn around the defect.    The area thus outlined was incised deep to adipose tissue with a #15 scalpel blade.  The skin margins were undermined to an appropriate distance in all directions utilizing iris scissors. Asthma, Hypothyroidism, Urinary bladder dysfunction

## 2024-02-26 ENCOUNTER — APPOINTMENT (OUTPATIENT)
Dept: OBGYN | Facility: CLINIC | Age: 54
End: 2024-02-26

## 2024-02-28 ENCOUNTER — TRANSCRIPTION ENCOUNTER (OUTPATIENT)
Age: 54
End: 2024-02-28

## 2024-02-28 ENCOUNTER — APPOINTMENT (OUTPATIENT)
Dept: OBGYN | Facility: CLINIC | Age: 54
End: 2024-02-28
Payer: COMMERCIAL

## 2024-02-28 VITALS
WEIGHT: 170 LBS | SYSTOLIC BLOOD PRESSURE: 149 MMHG | HEIGHT: 68 IN | HEART RATE: 102 BPM | BODY MASS INDEX: 25.76 KG/M2 | DIASTOLIC BLOOD PRESSURE: 85 MMHG

## 2024-02-28 DIAGNOSIS — N64.4 MASTODYNIA: ICD-10-CM

## 2024-02-28 DIAGNOSIS — D49.9 NEOPLASM OF UNSPECIFIED BEHAVIOR OF UNSPECIFIED SITE: ICD-10-CM

## 2024-02-28 DIAGNOSIS — Z01.419 ENCOUNTER FOR GYNECOLOGICAL EXAMINATION (GENERAL) (ROUTINE) W/OUT ABNORMAL FINDINGS: ICD-10-CM

## 2024-02-28 LAB
BILIRUB UR QL STRIP: NORMAL
CLARITY UR: CLEAR
COLLECTION METHOD: NORMAL
GLUCOSE UR-MCNC: NORMAL
HCG UR QL: 0.2 EU/DL
HGB UR QL STRIP.AUTO: NORMAL
KETONES UR-MCNC: NORMAL
LEUKOCYTE ESTERASE UR QL STRIP: NORMAL
NITRITE UR QL STRIP: NORMAL
PH UR STRIP: 6.5
PROT UR STRIP-MCNC: NORMAL
SP GR UR STRIP: 1.01

## 2024-02-28 PROCEDURE — 76830 TRANSVAGINAL US NON-OB: CPT

## 2024-02-28 PROCEDURE — 81003 URINALYSIS AUTO W/O SCOPE: CPT | Mod: QW

## 2024-02-28 PROCEDURE — 99396 PREV VISIT EST AGE 40-64: CPT | Mod: 25

## 2024-02-28 NOTE — PHYSICAL EXAM
[Examination Of The Breasts] : a normal appearance [Chaperone Present] : A chaperone was present in the examining room during all aspects of the physical examination [No Masses] : no breast masses were palpable [Labia Majora] : normal [Labia Minora] : normal [Moderate] : There was moderate vaginal bleeding [Normal] : normal [Uterine Adnexae] : normal

## 2024-02-28 NOTE — PROCEDURE
[Abnormal Uterine Bleeding] : abnormal uterine bleeding [Transvaginal Ultrasound] : transvaginal ultrasound [FreeTextEntry5] : 104 cc vol, 3 mm [FreeTextEntry7] : 2.0 cc [FreeTextEntry8] : 7.3 cc, with follicle

## 2024-02-28 NOTE — HISTORY OF PRESENT ILLNESS
[Irregular Menstrual Interval] : irregular menstrual interval [FreeTextEntry1] : here for her annual has periods q 2 weeks and they are all over the place...has concerns [___ Weeks] : [unfilled] weeks [Moderate Bleeding] : moderate bleeding

## 2024-02-28 NOTE — REVIEW OF SYSTEMS
Pt called stated she had not heard about results. I ask if she read her MyChart she said she did not, She then said she didn't want to use cpap, citing  was on one and it was horrible. I informed her Dr Seipel would want to follow up with her. She is to call [facility] to make appt.   [Recent Weight Loss (___ Lbs)] : recent [unfilled] ~Ulb weight loss [Abn Vaginal bleeding] : abnormal vaginal bleeding [Negative] : Breast Call primary care provider for follow up after discharge/Activities as tolerated/Low salt diet/Monitor weight daily/Report signs and symptoms to primary care provider

## 2024-03-01 LAB — HPV HIGH+LOW RISK DNA PNL CVX: NOT DETECTED

## 2024-03-06 LAB — CYTOLOGY CVX/VAG DOC THIN PREP: NORMAL

## 2024-03-19 ENCOUNTER — APPOINTMENT (OUTPATIENT)
Dept: OBGYN | Facility: CLINIC | Age: 54
End: 2024-03-19
Payer: COMMERCIAL

## 2024-03-19 VITALS
HEIGHT: 68 IN | BODY MASS INDEX: 26.22 KG/M2 | DIASTOLIC BLOOD PRESSURE: 84 MMHG | SYSTOLIC BLOOD PRESSURE: 135 MMHG | HEART RATE: 86 BPM | TEMPERATURE: 97.5 F | WEIGHT: 173 LBS

## 2024-03-19 PROCEDURE — 58558Z: CUSTOM

## 2024-03-19 RX ORDER — LITHIUM CARBONATE 300 MG/1
TABLET ORAL
Refills: 0 | Status: COMPLETED | COMMUNITY
End: 2024-03-19

## 2024-03-19 RX ORDER — LAMOTRIGINE 25 MG/1
TABLET ORAL
Refills: 0 | Status: ACTIVE | COMMUNITY

## 2024-03-19 RX ORDER — BUSPIRONE HYDROCHLORIDE 7.5 MG/1
TABLET ORAL
Refills: 0 | Status: ACTIVE | COMMUNITY

## 2024-03-19 RX ORDER — VENLAFAXINE HYDROCHLORIDE 150 MG/1
CAPSULE, EXTENDED RELEASE ORAL
Refills: 0 | Status: COMPLETED | COMMUNITY
End: 2024-03-19

## 2024-03-19 RX ORDER — MIRABEGRON 50 MG/1
TABLET, FILM COATED, EXTENDED RELEASE ORAL
Refills: 0 | Status: COMPLETED | COMMUNITY
End: 2024-03-19

## 2024-03-19 RX ORDER — MONTELUKAST SODIUM 10 MG/1
TABLET, FILM COATED ORAL
Refills: 0 | Status: COMPLETED | COMMUNITY
End: 2024-03-19

## 2024-03-19 RX ORDER — PROPRANOLOL HCL 80 MG
TABLET ORAL
Refills: 0 | Status: COMPLETED | COMMUNITY
End: 2024-03-19

## 2024-03-19 RX ORDER — DULAGLUTIDE 4.5 MG/.5ML
INJECTION, SOLUTION SUBCUTANEOUS
Refills: 0 | Status: COMPLETED | COMMUNITY
End: 2024-03-19

## 2024-03-19 RX ORDER — LORATADINE 5 MG
TABLET,CHEWABLE ORAL
Refills: 0 | Status: ACTIVE | COMMUNITY

## 2024-03-19 RX ORDER — SOLIFENACIN SUCCINATE 10 MG/1
TABLET, FILM COATED ORAL
Refills: 0 | Status: COMPLETED | COMMUNITY
End: 2024-03-19

## 2024-03-19 RX ORDER — MIDODRINE HYDROCHLORIDE 10 MG/1
TABLET ORAL
Refills: 0 | Status: ACTIVE | COMMUNITY

## 2024-03-19 RX ORDER — TRAZODONE HYDROCHLORIDE 300 MG/1
TABLET ORAL
Refills: 0 | Status: COMPLETED | COMMUNITY
End: 2024-03-19

## 2024-03-19 RX ORDER — BUDESONIDE AND FORMOTEROL FUMARATE DIHYDRATE 160; 4.5 UG/1; UG/1
AEROSOL RESPIRATORY (INHALATION)
Refills: 0 | Status: COMPLETED | COMMUNITY
End: 2024-03-19

## 2024-03-19 RX ORDER — HYALURONATE SODIUM 30 MG/2 ML
30 SYRINGE (ML) INTRAARTICULAR
Qty: 4 | Refills: 0 | Status: COMPLETED | OUTPATIENT
Start: 2019-09-11 | End: 2024-03-19

## 2024-03-19 NOTE — PROCEDURE
[Hysteroscopy] : Hysteroscopy [Time out performed] : Pre-procedure time out performed.  Patient's name, date of birth and procedure confirmed. [Consent Obtained] : Consent obtained [Risks] : risks [Benefits] : benefits [Alternatives] : alternatives [Patient] : patient [Infection] : infection [Bleeding] : bleeding [Allergic Reaction] : allergic reaction [Sent to Pathology] : specimen was placed in buffered formalin and sent for pathology [Hemostasis obtained] : hemostasis obtained [Tolerated Well] : Patient tolerated the procedure well [Aftercare instructions/regstrictions given and follow-up scheduled] : Aftercare instructions/restrictions given and follow-up scheduled [Abnormal uterine bleeding] : abnormal uterine bleeding [Antibiotics given] : antibiotics not given [rigid] : Using aseptic technique a hysteroscopy was performed using a rigid hysteroscope [de-identified] : CAVITY CLOSED ABOVE THE ENDOCERVIX COULD NOT ENTER SAMPLING AS HIGH AS POSSIBLE WITH ENDOSAMPLER AWAIT RESULTS IF PATIENT NEEDS HYSTERECTOMY, GOOD  [facility]

## 2024-03-20 ENCOUNTER — NON-APPOINTMENT (OUTPATIENT)
Age: 54
End: 2024-03-20

## 2024-03-30 LAB — CORE LAB BIOPSY: NORMAL

## 2024-04-01 ENCOUNTER — NON-APPOINTMENT (OUTPATIENT)
Age: 54
End: 2024-04-01

## 2024-04-01 ENCOUNTER — APPOINTMENT (OUTPATIENT)
Dept: OBGYN | Facility: CLINIC | Age: 54
End: 2024-04-01
Payer: COMMERCIAL

## 2024-04-01 VITALS
SYSTOLIC BLOOD PRESSURE: 140 MMHG | DIASTOLIC BLOOD PRESSURE: 86 MMHG | HEIGHT: 68 IN | BODY MASS INDEX: 26.22 KG/M2 | WEIGHT: 173 LBS | HEART RATE: 73 BPM

## 2024-04-01 DIAGNOSIS — N93.9 ABNORMAL UTERINE AND VAGINAL BLEEDING, UNSPECIFIED: ICD-10-CM

## 2024-04-01 PROCEDURE — 99213 OFFICE O/P EST LOW 20 MIN: CPT

## 2024-04-01 PROCEDURE — 99459 PELVIC EXAMINATION: CPT

## 2024-04-01 NOTE — PHYSICAL EXAM
[Labia Majora] : normal [Labia Minora] : normal [Moderate] : There was moderate vaginal bleeding [Normal] : normal [Uterine Adnexae] : normal 0

## 2024-04-01 NOTE — REVIEW OF SYSTEMS
[Recent Weight Loss (___ Lbs)] : recent [unfilled] ~Ulb weight loss [Abn Vaginal bleeding] : no abnormal vaginal bleeding [Negative] : Breast

## 2024-04-23 NOTE — ED PROVIDER NOTE - PSYCHIATRIC BEHAVIOR
The site was marked. The right groin and right radial was prepped. The site was prepped with ChloraPrep. The site was clipped. The patient was draped. PSYCHOMOTOR AGITATION

## 2024-06-03 ENCOUNTER — NON-APPOINTMENT (OUTPATIENT)
Age: 54
End: 2024-06-03

## 2024-06-06 ENCOUNTER — NON-APPOINTMENT (OUTPATIENT)
Age: 54
End: 2024-06-06

## 2024-06-17 ENCOUNTER — APPOINTMENT (OUTPATIENT)
Dept: OBGYN | Facility: CLINIC | Age: 54
End: 2024-06-17
Payer: COMMERCIAL

## 2024-06-17 VITALS
DIASTOLIC BLOOD PRESSURE: 82 MMHG | HEIGHT: 68 IN | SYSTOLIC BLOOD PRESSURE: 147 MMHG | WEIGHT: 173 LBS | BODY MASS INDEX: 26.22 KG/M2 | HEART RATE: 107 BPM

## 2024-06-17 DIAGNOSIS — N91.2 AMENORRHEA, UNSPECIFIED: ICD-10-CM

## 2024-06-17 PROCEDURE — 99214 OFFICE O/P EST MOD 30 MIN: CPT | Mod: 25

## 2024-06-17 PROCEDURE — 76830 TRANSVAGINAL US NON-OB: CPT

## 2024-06-17 PROCEDURE — 99459 PELVIC EXAMINATION: CPT

## 2024-06-17 NOTE — PHYSICAL EXAM
[Chaperone Present] : A chaperone was present in the examining room during all aspects of the physical examination [[id number]] : A chaperone was present during the pelvic exam. [Labia Majora] : normal [Labia Minora] : normal [Normal] : normal [Uterine Adnexae] : normal

## 2024-06-17 NOTE — HISTORY OF PRESENT ILLNESS
[FreeTextEntry1] : pt with amenorrhea since her d and c hysteroscopy, presents for evaluation [TextBox_11] : since her d and c on 3/19/24

## 2024-06-17 NOTE — PROCEDURE
[Amenorrhea] : Amenorrhea [Transvaginal Ultrasound] : transvaginal ultrasound [FreeTextEntry3] : no free fluid no cysts cervix normal [FreeTextEntry5] : 150 cc vol,  3.4 mm [FreeTextEntry7] : 5.6 cc [FreeTextEntry8] : 4.9 cc

## 2024-09-16 ENCOUNTER — NON-APPOINTMENT (OUTPATIENT)
Age: 54
End: 2024-09-16

## 2024-10-14 ENCOUNTER — NON-APPOINTMENT (OUTPATIENT)
Age: 54
End: 2024-10-14

## 2024-10-14 DIAGNOSIS — R92.8 OTHER ABNORMAL AND INCONCLUSIVE FINDINGS ON DIAGNOSTIC IMAGING OF BREAST: ICD-10-CM

## 2025-03-03 ENCOUNTER — APPOINTMENT (OUTPATIENT)
Dept: OBGYN | Facility: CLINIC | Age: 55
End: 2025-03-03
Payer: COMMERCIAL

## 2025-03-03 VITALS
SYSTOLIC BLOOD PRESSURE: 149 MMHG | HEIGHT: 68 IN | BODY MASS INDEX: 27.28 KG/M2 | DIASTOLIC BLOOD PRESSURE: 87 MMHG | HEART RATE: 75 BPM | WEIGHT: 180 LBS

## 2025-03-03 DIAGNOSIS — Z01.419 ENCOUNTER FOR GYNECOLOGICAL EXAMINATION (GENERAL) (ROUTINE) W/OUT ABNORMAL FINDINGS: ICD-10-CM

## 2025-03-03 DIAGNOSIS — N83.01 FOLLICULAR CYST OF RIGHT OVARY: ICD-10-CM

## 2025-03-03 DIAGNOSIS — N91.2 AMENORRHEA, UNSPECIFIED: ICD-10-CM

## 2025-03-03 LAB
BILIRUB UR QL STRIP: NORMAL
CLARITY UR: CLEAR
COLLECTION METHOD: NORMAL
GLUCOSE UR-MCNC: NORMAL
HCG UR QL: 0.2 EU/DL
HGB UR QL STRIP.AUTO: NORMAL
KETONES UR-MCNC: NORMAL
LEUKOCYTE ESTERASE UR QL STRIP: NORMAL
NITRITE UR QL STRIP: NORMAL
PH UR STRIP: 5.5
PROT UR STRIP-MCNC: NORMAL
SP GR UR STRIP: 1.01

## 2025-03-03 PROCEDURE — 99459 PELVIC EXAMINATION: CPT | Mod: NC

## 2025-03-03 PROCEDURE — 99386 PREV VISIT NEW AGE 40-64: CPT | Mod: 25

## 2025-03-03 PROCEDURE — 81003 URINALYSIS AUTO W/O SCOPE: CPT | Mod: QW

## 2025-03-03 PROCEDURE — 76830 TRANSVAGINAL US NON-OB: CPT

## 2025-03-03 RX ORDER — LITHIUM CARBONATE 300 MG/1
TABLET ORAL
Refills: 0 | Status: ACTIVE | COMMUNITY

## 2025-03-07 LAB — HPV HIGH+LOW RISK DNA PNL CVX: NOT DETECTED

## 2025-03-16 LAB — CYTOLOGY CVX/VAG DOC THIN PREP: NORMAL

## 2025-03-17 ENCOUNTER — NON-APPOINTMENT (OUTPATIENT)
Age: 55
End: 2025-03-17

## 2025-03-24 ENCOUNTER — NON-APPOINTMENT (OUTPATIENT)
Age: 55
End: 2025-03-24

## 2025-06-30 NOTE — ED PROVIDER NOTE - NS ED MD DISPO DIVISION
How Severe Is Your Cyst?: moderate
Is This A New Presentation, Or A Follow-Up?: Cyst
[facility]

## 2025-08-26 ENCOUNTER — NON-APPOINTMENT (OUTPATIENT)
Age: 55
End: 2025-08-26